# Patient Record
Sex: MALE | Race: WHITE | NOT HISPANIC OR LATINO | Employment: OTHER | URBAN - METROPOLITAN AREA
[De-identification: names, ages, dates, MRNs, and addresses within clinical notes are randomized per-mention and may not be internally consistent; named-entity substitution may affect disease eponyms.]

---

## 2017-12-06 ENCOUNTER — OFFICE VISIT (OUTPATIENT)
Dept: OTOLARYNGOLOGY | Facility: CLINIC | Age: 57
End: 2017-12-06
Payer: COMMERCIAL

## 2017-12-06 VITALS
BODY MASS INDEX: 29.95 KG/M2 | DIASTOLIC BLOOD PRESSURE: 84 MMHG | WEIGHT: 227 LBS | TEMPERATURE: 98 F | HEART RATE: 84 BPM | SYSTOLIC BLOOD PRESSURE: 143 MMHG

## 2017-12-06 DIAGNOSIS — J32.0 CHRONIC MAXILLARY SINUSITIS: Primary | ICD-10-CM

## 2017-12-06 DIAGNOSIS — R09.81 NASAL CONGESTION: ICD-10-CM

## 2017-12-06 PROCEDURE — 99203 OFFICE O/P NEW LOW 30 MIN: CPT | Performed by: OTOLARYNGOLOGY

## 2017-12-06 NOTE — PROGRESS NOTES
History of Present Illness - Akira Fair is a 56 year old male with complaints of sinus pressure and pain. He states that he has had progressively worsening pressure, swelling, and bleeding. Others are able to see the swelling in his face. He doesn't sleep well either.  He has a history of polyps that were removed in  2009 at [facility]. In 2011, he started having sinus pressure and pain again.   He uses Afrin when his symptoms are worse. He will use it up to 4 days in a row.     Past Medical History -   Patient Active Problem List   Diagnosis     Asthma     Personal history of arthritis     Vitamin D deficiency     Impotence of organic origin     Allergic rhinitis     DIANA (obstructive sleep apnea)       Current Medications -   Current Outpatient Prescriptions:      ASPIRIN PO, Take 81 mg by mouth daily, Disp: , Rfl:      Losartan Potassium (COZAAR PO), Take 50 mg by mouth, Disp: , Rfl:      fluticasone (FLONASE) 50 MCG/ACT nasal spray, Spray 1-2 sprays into both nostrils daily, Disp: 1 Package, Rfl: 11     loratadine (CLARITIN) 10 MG tablet, Take 1 tablet (10 mg) by mouth daily, Disp: 30 tablet, Rfl: 1     Tadalafil (CIALIS PO), , Disp: , Rfl:      magnesium oxide 400 MG CAPS, , Disp: , Rfl:      acetaminophen (TYLENOL) 500 MG tablet, Take 2 tablets by mouth every 6 hours as needed., Disp: , Rfl:      ALBUTEROL SULFATE  (90 BASE) MCG/ACT IN AERS, 2 puffs AS DIRECTED, Disp: , Rfl:      EPI E-Z PEN ЕКАТЕРИНА 1:1000  IJ, 1 TIME ONLY, Disp: , Rfl:      VIAGRA 100 MG OR TABS, 1 TABLET DAILY AS NEEDED, Disp: , Rfl:     Allergies -   Allergies   Allergen Reactions     Soma [Carisoprodol] Anaphylaxis     Amoxicillin Rash       Social History -   Social History     Social History     Marital status:      Spouse name: N/A     Number of children: N/A     Years of education: N/A     Social History Main Topics     Smoking status: Former Smoker     Packs/day: 1.00     Years: 30.00     Quit date: 4/12/2004      Smokeless tobacco: Never Used     Alcohol use Not on file     Drug use: Not on file     Sexual activity: Not on file     Other Topics Concern     Not on file     Social History Narrative       Family History -   Family History   Problem Relation Age of Onset     CANCER Mother      Coronary Artery Disease Father      MI       Review of Systems - As per HPI and PMHx, otherwise 7 system review of the head and neck negative. 10+ system review negative.    Physical Exam  /84 (BP Location: Right arm, Patient Position: Chair, Cuff Size: Adult Large)  Pulse 84  Temp 98  F (36.7  C) (Oral)  Wt 103 kg (227 lb)  BMI 29.95 kg/m2  General - The patient is well nourished and well developed, and appears to have good nutritional status.  Alert and oriented to person and place, answers questions and cooperates with examination appropriately.   Head and Face - Normocephalic and atraumatic, with no gross asymmetry noted of the contour of the facial features.  The facial nerve is intact, with strong symmetric movements.  Voice and Breathing - The patient was breathing comfortably without the use of accessory muscles. There was no wheezing, stridor, or stertor.  The patients voice was clear and strong, and had appropriate pitch and quality.  Ears - Bilateral pinna and EACs with normal appearing overlying skin. Tympanic membrane intact with good mobility on pneumatic otoscopy bilaterally. Bony landmarks of the ossicular chain are normal. The tympanic membranes are normal in appearance. No retraction, perforation, or masses.  No fluid or purulence was seen in the external canal or the middle ear.   Eyes - Extraocular movements intact.  Sclera were not icteric or injected, conjunctiva were pink and moist.  Mouth - Examination of the oral cavity showed pink, healthy oral mucosa. No lesions or ulcerations noted.  The tongue was mobile and midline, and the dentition were in good condition.    Throat - The walls of the oropharynx  were smooth, pink, moist, symmetric, and had no lesions or ulcerations.  The tonsillar pillars and soft palate were symmetric.  The uvula was midline on elevation.  Neck - Normal midline excursion of the laryngotracheal complex during swallowing.  Full range of motion on passive movement.  Palpation of the occipital, submental, submandibular, internal jugular chain, and supraclavicular nodes did not demonstrate any abnormal lymph nodes or masses.  The carotid pulse was palpable bilaterally.  Palpation of the thyroid was soft and smooth, with no nodules or goiter appreciated.  The trachea was mobile and midline.  Nose - External contour is symmetric, no gross deflection or scars.  Nasal mucosa is pink and moist with no abnormal mucus.  Inferior left septal deviation, turbinates of normal size and position.  No polyps, masses, or purulence noted on examination.          Assessment - Akira Fair is a 56 year old male with complaints of sinus pressure, swelling, and pain. This has been progressively worsening over the past several months. He is concerned that his polyps are returning in his sinuses. I reassured him that examination did not reveal any polyps, but further imaging would be needed to confirm any residual chronic sinusitis. I advised him that his nasal breathing seems to be fairly adequate and I would not strongly suspect that he would benefit from nasal surgery at this point, but a CT would be needed to fully evaluate. He will consult with this wife about proceeding with CT and will call to request order should he decide to schedule. Otherwise, he plans to simply continue as he is currently.     This document serves as a record of the services and decisions personally performed and made by Dr. Nate Keith MD. It was created on his behalf by Julia Vargas, a trained medical scribe. The creation of this document is based the provider's statements to the medical scribe.  Julia Vargas 2:09 PM  12/6/2017    Provider:   The information in this document, created by the medical scribe for me, accurately reflects the services I personally performed and the decisions made by me. I have reviewed and approved this document for accuracy prior to leaving the patient care area.  Dr. Nate Keith MD 2:09 PM 12/6/2017    Dr. Nate Keith MD  [facility]tolaryngology  [facility]

## 2017-12-06 NOTE — NURSING NOTE
"Initial /84 (BP Location: Right arm, Patient Position: Chair, Cuff Size: Adult Large)  Pulse 84  Temp 98  F (36.7  C) (Oral)  Wt 103 kg (227 lb)  BMI 29.95 kg/m2 Estimated body mass index is 29.95 kg/(m^2) as calculated from the following:    Height as of 12/29/15: 1.854 m (6' 1\").    Weight as of this encounter: 103 kg (227 lb). .    Maria Gilbert LPN    "

## 2017-12-06 NOTE — MR AVS SNAPSHOT
"              After Visit Summary   2017    Akira Fair    MRN: [id number]           Patient Information     Date Of Birth          1960        Visit Information        Provider Department      2017 1:45 PM Nate Keith MD [facility]        Today's Diagnoses     Chronic maxillary sinusitis    -  1      Care Instructions    Per physician's instructions    You can call and schedule a CT scan if you would like to confirm if there are any obstructions in your sinuses.           Follow-ups after your visit        Who to contact     If you have questions or need follow up information about today's clinic visit or your schedule please contact [facility] directly at [phone].  Normal or non-critical lab and imaging results will be communicated to you by MyChart, letter or phone within 4 business days after the clinic has received the results. If you do not hear from us within 7 days, please contact the clinic through MyChart or phone. If you have a critical or abnormal lab result, we will notify you by phone as soon as possible.  Submit refill requests through [employer] or call your pharmacy and they will forward the refill request to us. Please allow 3 business days for your refill to be completed.          Additional Information About Your Visit        MyChart Information     [employer] lets you send messages to your doctor, view your test results, renew your prescriptions, schedule appointments and more. To sign up, go to [email or web address] . Click on \"Log in\" on the left side of the screen, which will take you to the Welcome page. Then click on \"Sign up Now\" on the right side of the page.     You will be asked to enter the access code listed below, as well as some personal information. Please follow the directions to create your username and password.     Your access code is: [email or web address]  Expires: 3/6/2018  2:10 PM     Your access code will  in 90 days. If you " need help or a new code, please call your [place] clinic or [phone].        Care EveryWhere ID     This is your Care EveryWhere ID. This could be used by other organizations to access your [place] medical records  [id number]        Your Vitals Were     Pulse Temperature BMI (Body Mass Index)             84 98  F (36.7  C) (Oral) 29.95 kg/m2          Blood Pressure from Last 3 Encounters:   12/06/17 143/84   12/29/15 147/88   10/27/15 145/85    Weight from Last 3 Encounters:   12/06/17 103 kg (227 lb)   12/29/15 95.3 kg (210 lb)   10/27/15 101.2 kg (223 lb)              Today, you had the following     No orders found for display       Primary Care Provider Office Phone # Fax #    Steven Duane Semmler, [phone]       [facility] [address] [facility] [address]        Equal Access to Services     LEN[facility] Center[facility] : Hadii esther ku hadasho Soomaali, waaxda luqadaha, qaybta kaalmada adeegyada, miracle rodriguez . So [facility] [phone].    ATENCIÓN: Si habla español, tiene a qureshi disposición servicios gratuitos de asistencia lingüística. Llame al [phone].    We comply with applicable federal civil rights laws and [place] laws. We do not discriminate on the basis of race, color, national origin, age, disability, sex, sexual orientation, or gender identity.            Thank you!     Thank you for choosing [facility]  for your care. Our goal is always to provide you with excellent care. Hearing back from our patients is one way we can continue to improve our services. Please take a few minutes to complete the written survey that you may receive in the mail after your visit with us. Thank you!             Your Updated Medication List - Protect others around you: Learn how to safely use, store and throw away your medicines at www.disposemymeds.org.          This list is accurate as of: 12/6/17  2:10 PM.  Always use your most recent med list.                    Brand Name Dispense Instructions for use Diagnosis    albuterol 108 (90 BASE) MCG/ACT Inhaler    PROAIR HFA/PROVENTIL HFA/VENTOLIN HFA     2 puffs AS DIRECTED        ASPIRIN PO      Take 81 mg by mouth daily        CIALIS PO           CLARITIN 10 MG tablet   Generic drug:  loratadine     30 tablet    Take 1 tablet (10 mg) by mouth daily        COZAAR PO      Take 50 mg by mouth        EPI E-Z PEN ЕКАТЕРИНА 1:1000  IJ      1 TIME ONLY        FLONASE 50 MCG/ACT spray   Generic drug:  fluticasone     1 Package    Spray 1-2 sprays into both nostrils daily        magnesium oxide 400 MG Caps           TYLENOL 500 MG tablet   Generic drug:  acetaminophen      Take 2 tablets by mouth every 6 hours as needed.        VIAGRA 100 MG tablet   Generic drug:  sildenafil      1 TABLET DAILY AS NEEDED

## 2017-12-06 NOTE — PATIENT INSTRUCTIONS
Per physician's instructions    You can call and schedule a CT scan if you would like to confirm if there are any obstructions in your sinuses.

## 2017-12-06 NOTE — LETTER
12/6/2017         RE: Akira Fair  [address] [facility] [address]        Dear Colleague,    Thank you for referring your patient, Akira Fair, to [facility]. Please see a copy of my visit note below.        History of Present Illness - Akira Fair is a 56 year old male with complaints of sinus pressure and pain. He states that he has had progressively worsening pressure, swelling, and bleeding. Others are able to see the swelling in his face. He doesn't sleep well either.  He has a history of polyps that were removed in  2009 at [facility]. In 2011, he started having sinus pressure and pain again.   He uses Afrin when his symptoms are worse. He will use it up to 4 days in a row.     Past Medical History -   Patient Active Problem List   Diagnosis     Asthma     Personal history of arthritis     Vitamin D deficiency     Impotence of organic origin     Allergic rhinitis     DIANA (obstructive sleep apnea)       Current Medications -   Current Outpatient Prescriptions:      ASPIRIN PO, Take 81 mg by mouth daily, Disp: , Rfl:      Losartan Potassium (COZAAR PO), Take 50 mg by mouth, Disp: , Rfl:      fluticasone (FLONASE) 50 MCG/ACT nasal spray, Spray 1-2 sprays into both nostrils daily, Disp: 1 Package, Rfl: 11     loratadine (CLARITIN) 10 MG tablet, Take 1 tablet (10 mg) by mouth daily, Disp: 30 tablet, Rfl: 1     Tadalafil (CIALIS PO), , Disp: , Rfl:      magnesium oxide 400 MG CAPS, , Disp: , Rfl:      acetaminophen (TYLENOL) 500 MG tablet, Take 2 tablets by mouth every 6 hours as needed., Disp: , Rfl:      ALBUTEROL SULFATE  (90 BASE) MCG/ACT IN AERS, 2 puffs AS DIRECTED, Disp: , Rfl:      EPI E-Z PEN ЕКАТЕРИНА 1:1000  IJ, 1 TIME ONLY, Disp: , Rfl:      VIAGRA 100 MG OR TABS, 1 TABLET DAILY AS NEEDED, Disp: , Rfl:     Allergies -   Allergies   Allergen Reactions     Soma [Carisoprodol] Anaphylaxis     Amoxicillin Rash       Social History -   Social History     Social History      Marital status:      Spouse name: N/A     Number of children: N/A     Years of education: N/A     Social History Main Topics     Smoking status: Former Smoker     Packs/day: 1.00     Years: 30.00     Quit date: 4/12/2004     Smokeless tobacco: Never Used     Alcohol use Not on file     Drug use: Not on file     Sexual activity: Not on file     Other Topics Concern     Not on file     Social History Narrative       Family History -   Family History   Problem Relation Age of Onset     CANCER Mother      Coronary Artery Disease Father      MI       Review of Systems - As per HPI and PMHx, otherwise 7 system review of the head and neck negative. 10+ system review negative.    Physical Exam  /84 (BP Location: Right arm, Patient Position: Chair, Cuff Size: Adult Large)  Pulse 84  Temp 98  F (36.7  C) (Oral)  Wt 103 kg (227 lb)  BMI 29.95 kg/m2  General - The patient is well nourished and well developed, and appears to have good nutritional status.  Alert and oriented to person and place, answers questions and cooperates with examination appropriately.   Head and Face - Normocephalic and atraumatic, with no gross asymmetry noted of the contour of the facial features.  The facial nerve is intact, with strong symmetric movements.  Voice and Breathing - The patient was breathing comfortably without the use of accessory muscles. There was no wheezing, stridor, or stertor.  The patients voice was clear and strong, and had appropriate pitch and quality.  Ears - Bilateral pinna and EACs with normal appearing overlying skin. Tympanic membrane intact with good mobility on pneumatic otoscopy bilaterally. Bony landmarks of the ossicular chain are normal. The tympanic membranes are normal in appearance. No retraction, perforation, or masses.  No fluid or purulence was seen in the external canal or the middle ear.   Eyes - Extraocular movements intact.  Sclera were not icteric or injected, conjunctiva were pink and  moist.  Mouth - Examination of the oral cavity showed pink, healthy oral mucosa. No lesions or ulcerations noted.  The tongue was mobile and midline, and the dentition were in good condition.    Throat - The walls of the oropharynx were smooth, pink, moist, symmetric, and had no lesions or ulcerations.  The tonsillar pillars and soft palate were symmetric.  The uvula was midline on elevation.  Neck - Normal midline excursion of the laryngotracheal complex during swallowing.  Full range of motion on passive movement.  Palpation of the occipital, submental, submandibular, internal jugular chain, and supraclavicular nodes did not demonstrate any abnormal lymph nodes or masses.  The carotid pulse was palpable bilaterally.  Palpation of the thyroid was soft and smooth, with no nodules or goiter appreciated.  The trachea was mobile and midline.  Nose - External contour is symmetric, no gross deflection or scars.  Nasal mucosa is pink and moist with no abnormal mucus.  Inferior left septal deviation, turbinates of normal size and position.  No polyps, masses, or purulence noted on examination.          Assessment - Akira Fair is a 56 year old male with complaints of sinus pressure, swelling, and pain. This has been progressively worsening over the past several months. He is concerned that his polyps are returning in his sinuses. I reassured him that examination did not reveal any polyps, but further imaging would be needed to confirm any residual chronic sinusitis. I advised him that his nasal breathing seems to be fairly adequate and I would not strongly suspect that he would benefit from nasal surgery at this point, but a CT would be needed to fully evaluate. He will consult with this wife about proceeding with CT and will call to request order should he decide to schedule. Otherwise, he plans to simply continue as he is currently.     This document serves as a record of the services and decisions personally performed  and made by Dr. Nate Keith MD. It was created on his behalf by Julia Vargsa, a trained medical scribe. The creation of this document is based the provider's statements to the medical scribe.  Julia Vargas 2:09 PM 12/6/2017    Provider:   The information in this document, created by the medical scribe for me, accurately reflects the services I personally performed and the decisions made by me. I have reviewed and approved this document for accuracy prior to leaving the patient care area.  Dr. Nate Keith MD 2:09 PM 12/6/2017    Dr. Nate Keith MD  Otolaryngology  [facility]        Again, thank you for allowing me to participate in the care of your patient.        Sincerely,        Nate Keith MD

## 2018-10-15 ENCOUNTER — OFFICE VISIT (OUTPATIENT)
Dept: UROLOGY | Facility: CLINIC | Age: 58
End: 2018-10-15
Payer: COMMERCIAL

## 2018-10-15 VITALS
RESPIRATION RATE: 16 BRPM | BODY MASS INDEX: 31.81 KG/M2 | HEART RATE: 70 BPM | TEMPERATURE: 98.8 F | HEIGHT: 73 IN | DIASTOLIC BLOOD PRESSURE: 73 MMHG | WEIGHT: 240 LBS | SYSTOLIC BLOOD PRESSURE: 126 MMHG

## 2018-10-15 DIAGNOSIS — N52.02 CORPORO-VENOUS OCCLUSIVE ERECTILE DYSFUNCTION: Primary | ICD-10-CM

## 2018-10-15 PROCEDURE — 99202 OFFICE O/P NEW SF 15 MIN: CPT | Performed by: UROLOGY

## 2018-10-15 RX ORDER — SILDENAFIL CITRATE 20 MG/1
TABLET ORAL
COMMUNITY
Start: 2018-09-06

## 2018-10-15 RX ORDER — ATORVASTATIN CALCIUM 10 MG/1
5 TABLET, FILM COATED ORAL EVERY MORNING
COMMUNITY
Start: 2018-09-06 | End: 2024-03-26

## 2018-10-15 RX ORDER — EPINEPHRINE 0.3 MG/.3ML
0.3 INJECTION SUBCUTANEOUS
COMMUNITY
Start: 2018-09-06

## 2018-10-15 NOTE — NURSING NOTE
"Chief Complaint   Patient presents with     Erectile Dysfunction     consult       Initial /73 (BP Location: Right arm, Patient Position: Chair, Cuff Size: Adult Regular)  Pulse 70  Temp 98.8  F (37.1  C) (Oral)  Resp 16  Ht 1.854 m (6' 1\")  Wt 108.9 kg (240 lb)  BMI 31.66 kg/m2 Estimated body mass index is 31.66 kg/(m^2) as calculated from the following:    Height as of this encounter: 1.854 m (6' 1\").    Weight as of this encounter: 108.9 kg (240 lb).  Medications and allergies reviewed.    Margarita CAMPBELL, ENZO    "

## 2018-10-15 NOTE — MR AVS SNAPSHOT
"              After Visit Summary   10/15/2018    Akira Fair    MRN: [id number]           Patient Information     Date Of Birth          1960        Visit Information        Provider Department      10/15/2018 8:30 AM NORMAN Kauffman MD [facility]        Today's Diagnoses     Corporo-venous occlusive erectile dysfunction    -  1       Follow-ups after your visit        Who to contact     If you have questions or need follow up information about today's clinic visit or your schedule please contact [facility] directly at [phone].  Normal or non-critical lab and imaging results will be communicated to you by MyChart, letter or phone within 4 business days after the clinic has received the results. If you do not hear from us within 7 days, please contact the clinic through MyChart or phone. If you have a critical or abnormal lab result, we will notify you by phone as soon as possible.  Submit refill requests through [employer] or call your pharmacy and they will forward the refill request to us. Please allow 3 business days for your refill to be completed.          Additional Information About Your Visit        Care EveryWhere ID     This is your Care EveryWhere ID. This could be used by other organizations to access your [place] medical records  [id number]        Your Vitals Were     Pulse Temperature Respirations Height BMI (Body Mass Index)       70 98.8  F (37.1  C) (Oral) 16 1.854 m (6' 1\") 31.66 kg/m2        Blood Pressure from Last 3 Encounters:   10/15/18 126/73   12/06/17 143/84   12/29/15 147/88    Weight from Last 3 Encounters:   10/15/18 108.9 kg (240 lb)   12/06/17 103 kg (227 lb)   12/29/15 95.3 kg (210 lb)              Today, you had the following     No orders found for display         Today's Medication Changes          These changes are accurate as of 10/15/18 11:59 PM.  If you have any questions, ask your nurse or doctor.               Start taking these " medicines.        Dose/Directions    alprostadil 20 MCG kit   Commonly known as:  EDEX   Used for:  Corporo-venous occlusive erectile dysfunction   Started by:  NORMAN Kauffman MD        Dose:  20 mcg   20 mcg by Intracavitary route as needed for erectile dysfunction use no more than 3 times per week   Quantity:  1 kit   Refills:  3            Where to get your medicines      Some of these will need a paper prescription and others can be bought over the counter.  Ask your nurse if you have questions.     Bring a paper prescription for each of these medications     alprostadil 20 MCG kit                Primary Care Provider Office Phone # Fax #    Elvin Duane Semmler, [phone]       [facility] [address] [facility] [address]        Equal Access to Services     JAYESH MIMS : Hadii esther parra hadasho Soomaali, waaxda luqadaha, qaybta kaalmada adeegyada, miracle rodriguez . So [facility] [phone].    ATENCIÓN: Si habla español, tiene a qureshi disposición servicios gratuitos de asistencia lingüística. [facility] [phone].    We comply with applicable federal civil rights laws and [place] laws. We do not discriminate on the basis of race, color, national origin, age, disability, sex, sexual orientation, or gender identity.            Thank you!     Thank you for choosing [facility]  for your care. Our goal is always to provide you with excellent care. Hearing back from our patients is one way we can continue to improve our services. Please take a few minutes to complete the written survey that you may receive in the mail after your visit with us. Thank you!             Your Updated Medication List - Protect others around you: Learn how to safely use, store and throw away your medicines at www.disposemymeds.org.          This list is accurate as of 10/15/18 11:59 PM.  Always use your most recent med list.                   Brand Name Dispense  Instructions for use Diagnosis    albuterol 108 (90 Base) MCG/ACT inhaler    PROAIR HFA/PROVENTIL HFA/VENTOLIN HFA     2 puffs AS DIRECTED        alprostadil 20 MCG kit    EDEX    1 kit    20 mcg by Intracavitary route as needed for erectile dysfunction use no more than 3 times per week    Corporo-venous occlusive erectile dysfunction       ASPIRIN PO      Take 81 mg by mouth daily        atorvastatin 10 MG tablet    LIPITOR     Take 5 mg by mouth        CLARITIN 10 MG tablet   Generic drug:  loratadine     30 tablet    Take 1 tablet (10 mg) by mouth daily        COZAAR PO      Take 50 mg by mouth        EPI E-Z PEN ЕКАТЕРИНА 1:1000  IJ      1 TIME ONLY        EPINEPHrine 0.3 MG/0.3ML injection 2-pack    EPIPEN/ADRENACLICK/or ANY BX GENERIC EQUIV     Inject 0.3 mg into the muscle        FLONASE 50 MCG/ACT spray   Generic drug:  fluticasone     1 Package    Spray 1-2 sprays into both nostrils daily        magnesium oxide 400 MG Caps           sildenafil 20 MG tablet    REVATIO     Take 1-5 tablet as needed for erectile dysfunction        TYLENOL 500 MG tablet   Generic drug:  acetaminophen      Take 2 tablets by mouth every 6 hours as needed.

## 2018-10-16 NOTE — PROGRESS NOTES
Appointment source: New Patient  Patient name: Akira Fair  Urology Staff: Rich Kauffman MD    Consultation request Elvin Payton MD    Subjective: This is a 57 year old year old male complaining of erectile dysfunction.    Objective:  States that erections have not been satisfactory for intercourse for about a year.    Has had some success with phosphodiesterase inhibitors.    Family history: prostate cancer (father)    Social history: reviewed from primary care clinic visit. History of smoking and occasional alcohol.    ROS: comprehensive 10 point ROS obtained and otherwise negative other than chief complaint and problem list    PSA 1.82 ng/ml (9/6/18)    Examination:    Healthy male  HEENT: anicteric sclera, normal extraocular movements  Respiratory: normal, non labored breathing  Musculoskeletal:Normal muscular movements  Skin normal temperature, no rash  Psychiatric: appropriate affect    Abdomen benign  No evidence of inguinal hernia  No evidence of inguinal adenopathy  Phallus without lesion. No evidence of peyronie's plaque  Scrotal contents normal  Rectal examination normal  Prostate benign to palpation      Assessment:  erectile dysfunction (multi factorial)    Plan:  Will continue use of phosphodiesterase inhibitors but also prescribe intra cavernosal prostaglandin.    He will consider these interventions and get back to me.

## 2018-12-09 ENCOUNTER — APPOINTMENT (OUTPATIENT)
Dept: GENERAL RADIOLOGY | Facility: CLINIC | Age: 58
End: 2018-12-09
Attending: EMERGENCY MEDICINE
Payer: COMMERCIAL

## 2018-12-09 ENCOUNTER — APPOINTMENT (OUTPATIENT)
Dept: CT IMAGING | Facility: CLINIC | Age: 58
End: 2018-12-09
Attending: EMERGENCY MEDICINE
Payer: COMMERCIAL

## 2018-12-09 ENCOUNTER — HOSPITAL ENCOUNTER (EMERGENCY)
Facility: CLINIC | Age: 58
Discharge: HOME | End: 2018-12-09
Attending: EMERGENCY MEDICINE | Admitting: EMERGENCY MEDICINE
Payer: COMMERCIAL

## 2018-12-09 VITALS
BODY MASS INDEX: 31 KG/M2 | DIASTOLIC BLOOD PRESSURE: 79 MMHG | OXYGEN SATURATION: 95 % | SYSTOLIC BLOOD PRESSURE: 131 MMHG | WEIGHT: 235 LBS | HEART RATE: 97 BPM | RESPIRATION RATE: 27 BRPM | TEMPERATURE: 98 F

## 2018-12-09 DIAGNOSIS — K76.89 HEPATIC CYST: ICD-10-CM

## 2018-12-09 DIAGNOSIS — R07.81 PLEURITIC CHEST PAIN: ICD-10-CM

## 2018-12-09 DIAGNOSIS — R91.8 PULMONARY NODULES: ICD-10-CM

## 2018-12-09 LAB
ALBUMIN SERPL-MCNC: 3.9 G/DL (ref 3.4–5)
ALP SERPL-CCNC: 73 U/L (ref 40–150)
ALT SERPL W P-5'-P-CCNC: 46 U/L (ref 0–70)
ANION GAP SERPL CALCULATED.3IONS-SCNC: 8 MMOL/L (ref 3–14)
AST SERPL W P-5'-P-CCNC: 20 U/L (ref 0–45)
BASOPHILS # BLD AUTO: 0 10E9/L (ref 0–0.2)
BASOPHILS NFR BLD AUTO: 0.3 %
BILIRUB SERPL-MCNC: 0.7 MG/DL (ref 0.2–1.3)
BUN SERPL-MCNC: 18 MG/DL (ref 7–30)
CALCIUM SERPL-MCNC: 8.8 MG/DL (ref 8.5–10.1)
CHLORIDE SERPL-SCNC: 105 MMOL/L (ref 94–109)
CO2 SERPL-SCNC: 25 MMOL/L (ref 20–32)
CREAT SERPL-MCNC: 1.05 MG/DL (ref 0.66–1.25)
DIFFERENTIAL METHOD BLD: NORMAL
EOSINOPHIL # BLD AUTO: 0.2 10E9/L (ref 0–0.7)
EOSINOPHIL NFR BLD AUTO: 1.4 %
ERYTHROCYTE [DISTWIDTH] IN BLOOD BY AUTOMATED COUNT: 12.5 % (ref 10–15)
GFR SERPL CREATININE-BSD FRML MDRD: 72 ML/MIN/1.7M2
GLUCOSE SERPL-MCNC: 117 MG/DL (ref 70–99)
HCT VFR BLD AUTO: 47 % (ref 40–53)
HGB BLD-MCNC: 16.2 G/DL (ref 13.3–17.7)
IMM GRANULOCYTES # BLD: 0 10E9/L (ref 0–0.4)
IMM GRANULOCYTES NFR BLD: 0.2 %
LYMPHOCYTES # BLD AUTO: 1.9 10E9/L (ref 0.8–5.3)
LYMPHOCYTES NFR BLD AUTO: 17.7 %
MCH RBC QN AUTO: 31.2 PG (ref 26.5–33)
MCHC RBC AUTO-ENTMCNC: 34.5 G/DL (ref 31.5–36.5)
MCV RBC AUTO: 91 FL (ref 78–100)
MONOCYTES # BLD AUTO: 1.1 10E9/L (ref 0–1.3)
MONOCYTES NFR BLD AUTO: 10 %
NEUTROPHILS # BLD AUTO: 7.6 10E9/L (ref 1.6–8.3)
NEUTROPHILS NFR BLD AUTO: 70.4 %
NRBC # BLD AUTO: 0 10*3/UL
NRBC BLD AUTO-RTO: 0 /100
NT-PROBNP SERPL-MCNC: 38 PG/ML (ref 0–900)
PLATELET # BLD AUTO: 227 10E9/L (ref 150–450)
POTASSIUM SERPL-SCNC: 4 MMOL/L (ref 3.4–5.3)
PROT SERPL-MCNC: 7.4 G/DL (ref 6.8–8.8)
RBC # BLD AUTO: 5.19 10E12/L (ref 4.4–5.9)
SODIUM SERPL-SCNC: 138 MMOL/L (ref 133–144)
TROPONIN I SERPL-MCNC: <0.015 UG/L (ref 0–0.04)
WBC # BLD AUTO: 10.7 10E9/L (ref 4–11)

## 2018-12-09 PROCEDURE — 25000128 H RX IP 250 OP 636: Performed by: EMERGENCY MEDICINE

## 2018-12-09 PROCEDURE — 93010 ELECTROCARDIOGRAM REPORT: CPT | Mod: Z6 | Performed by: EMERGENCY MEDICINE

## 2018-12-09 PROCEDURE — 96374 THER/PROPH/DIAG INJ IV PUSH: CPT | Mod: 59 | Performed by: EMERGENCY MEDICINE

## 2018-12-09 PROCEDURE — 80053 COMPREHEN METABOLIC PANEL: CPT | Performed by: EMERGENCY MEDICINE

## 2018-12-09 PROCEDURE — 85025 COMPLETE CBC W/AUTO DIFF WBC: CPT | Performed by: EMERGENCY MEDICINE

## 2018-12-09 PROCEDURE — 99285 EMERGENCY DEPT VISIT HI MDM: CPT | Mod: 25 | Performed by: EMERGENCY MEDICINE

## 2018-12-09 PROCEDURE — 71046 X-RAY EXAM CHEST 2 VIEWS: CPT

## 2018-12-09 PROCEDURE — 71260 CT THORAX DX C+: CPT

## 2018-12-09 PROCEDURE — 93005 ELECTROCARDIOGRAM TRACING: CPT | Performed by: EMERGENCY MEDICINE

## 2018-12-09 PROCEDURE — 83880 ASSAY OF NATRIURETIC PEPTIDE: CPT | Performed by: EMERGENCY MEDICINE

## 2018-12-09 PROCEDURE — 84484 ASSAY OF TROPONIN QUANT: CPT | Performed by: EMERGENCY MEDICINE

## 2018-12-09 PROCEDURE — 25000125 ZZHC RX 250: Performed by: EMERGENCY MEDICINE

## 2018-12-09 RX ORDER — ASPIRIN 81 MG/1
81 TABLET ORAL DAILY
COMMUNITY
Start: 2015-07-15

## 2018-12-09 RX ORDER — KETOROLAC TROMETHAMINE 30 MG/ML
30 INJECTION, SOLUTION INTRAMUSCULAR; INTRAVENOUS ONCE
Status: COMPLETED | OUTPATIENT
Start: 2018-12-09 | End: 2018-12-09

## 2018-12-09 RX ORDER — IOPAMIDOL 755 MG/ML
85 INJECTION, SOLUTION INTRAVASCULAR ONCE
Status: COMPLETED | OUTPATIENT
Start: 2018-12-09 | End: 2018-12-09

## 2018-12-09 RX ORDER — ASPIRIN 325 MG/1
325 TABLET, FILM COATED ORAL ONCE
COMMUNITY
End: 2024-04-23

## 2018-12-09 RX ADMIN — IOPAMIDOL 85 ML: 755 INJECTION, SOLUTION INTRAVENOUS at 15:57

## 2018-12-09 RX ADMIN — SODIUM CHLORIDE 100 ML: 9 INJECTION, SOLUTION INTRAVENOUS at 15:58

## 2018-12-09 RX ADMIN — KETOROLAC TROMETHAMINE 30 MG: 30 INJECTION, SOLUTION INTRAMUSCULAR at 12:06

## 2018-12-09 NOTE — ED AVS SNAPSHOT
[facility] Emergency Department  [address] [facility] [address] [address]  Phone:  [phone]  Fax:  [phone]                                    Akira Fair   MRN: [id number]    Department:  [facility] Emergency Department   Date of Visit:  12/9/2018           After Visit Summary Signature Page    I have received my discharge instructions, and my questions have been answered. I have discussed any challenges I see with this plan with the nurse or doctor.    ..........................................................................................................................................  Patient/Patient Representative Signature      ..........................................................................................................................................  Patient Representative Print Name and Relationship to Patient    ..................................................               ................................................  Date                                   Time    ..........................................................................................................................................  Reviewed by Signature/Title    ...................................................              ..............................................  Date                                               Time          22EPIC Rev 08/18

## 2018-12-09 NOTE — ED PROVIDER NOTES
"  History     Chief Complaint   Patient presents with     Shortness of Breath     chest tightness     HPI  Akira Fair is a 58 year old male who presents to the ED complaining of chest pain. The patient states that at 7 pm last night his heart rate increased and he felt as if pleurisy was beginning which made it hard to breath. He also mentions that he felt like his chest was \"spasming\" and he felt very nauseas so he went to bed. The patient woke up today very sore and experienced \"spasming in and across the chest down to the xiphoid process\" so he came to the ED. He reports that right now his chest is \"stinging.\" He states it does not hurt to push on the chest, but it is painful to breath, especially when taking deep breaths. Patient says he cannot think of a cause for the pain and he has never had the \"spasming\" feeling before, though he had pleurisy 30 years ago. He states that moving backwards is more painful. His bowel movements have been normal and he denies any leg symptoms.  No history of a risk factor for DVT/PE.  Patient still has his gallbladder.      Problem List:    Patient Active Problem List    Diagnosis Date Noted     DIANA (obstructive sleep apnea) 10/22/2013     Priority: Medium     Mild DIANA (HST on 10/21/2013 with AHI 9.4, odalys desat 84%)       Asthma      Priority: Medium     Personal history of arthritis      Priority: Medium     Vitamin D deficiency      Priority: Medium     Problem list name updated by automated process. Provider to review       Impotence of organic origin      Priority: Medium     Allergic rhinitis      Priority: Medium        Past Medical History:    Past Medical History:   Diagnosis Date     Allergic rhinitis      Asthma      Impotence of organic origin      Personal history of arthritis      PONV (postoperative nausea and vomiting)      Unspecified vitamin D deficiency        Past Surgical History:    Past Surgical History:   Procedure Laterality Date     ENDOSCOPIC " RELEASE ULNAR NERVE (ELBOW) Right 10/27/2015    Procedure: ENDOSCOPIC RELEASE ULNAR NERVE (ELBOW);  Surgeon: Elvin Dey MD;  Location: [facility]     ENDOSCOPIC TRANSPOSITION ULNAR NERVE (ELBOW) Left 2015    Procedure: ENDOSCOPIC TRANSPOSITION ULNAR NERVE (ELBOW);  Surgeon: Elvin Dey MD;  Location: [facility]     NASAL/SINUS POLYPECTOMY Bilateral 2009    [facility]     ORTHOPEDIC SURGERY       RELEASE CARPAL TUNNEL  10/27/2015    Procedure: RELEASE CARPAL TUNNEL;  Surgeon: Elvin Dey MD;  Location: [facility]     RELEASE CARPAL TUNNEL Left 2015    Procedure: RELEASE CARPAL TUNNEL;  Surgeon: Elvin Dey MD;  Location: [facility]       Family History:    Family History   Problem Relation Age of Onset     Cancer Mother      Coronary Artery Disease Father         MI       Social History:  Marital Status:   [2]  Social History     Tobacco Use     Smoking status: Former Smoker     Packs/day: 1.00     Years: 30.00     Pack years: 30.00     Last attempt to quit: 2004     Years since quittin.6     Smokeless tobacco: Never Used   Substance Use Topics     Alcohol use: Yes     Comment: occas     Drug use: No        Medications:      aspirin - buffered (ASCRIPTIN) 325 MG TABS tablet   aspirin 81 MG EC tablet   atorvastatin (LIPITOR) 10 MG tablet   fluticasone (FLONASE) 50 MCG/ACT nasal spray   loratadine (CLARITIN) 10 MG tablet   Losartan Potassium (COZAAR PO)   magnesium oxide 400 MG CAPS   sildenafil (REVATIO) 20 MG tablet   ALBUTEROL SULFATE  (90 BASE) MCG/ACT IN AERS   EPINEPHrine (EPIPEN/ADRENACLICK/OR ANY BX GENERIC EQUIV) 0.3 MG/0.3ML injection 2-pack         Review of Systems   All other systems are reviewed and are negative.    Physical Exam   BP: 147/83  Pulse: 97  Heart Rate: 87  Temp: 98  F (36.7  C)  Resp: 18  Weight: 106.6 kg (235 lb)  SpO2: 98 %      Physical Exam   Nontoxic appearing no respiratory distress alert and oriented ×3  Head atraumatic  normocephalic  TMs/EACs unremarkable, conjunctiva noninjected, oropharynx moist without lesions or erythema  No cervical adenopathy   Neck supple full active painless range of motion  Lungs clear to auscultation  Heart regular no murmur  Abdomen soft nontender bowel sounds positive no masses or HSM  Strength and sensation grossly intact throughout the extremities, gait and station normal  Speech is fluent, good eye contact, thought processes are rational  Lower extremities without swelling, redness or tenderness  Pedal pulses symmetrical and strong    ED Course        Procedures  EKG normal sinus rhythm rate 87, RSR prime, no acute ST-T wave changes, no prior for comparison, read by Dr. Juan Carlos Hogan             Critical Care time:  none     Results for orders placed or performed during the hospital encounter of 12/09/18   Chest XR,  PA & LAT    Narrative    CHEST TWO VIEWS  12/9/2018 12:04 PM     HISTORY: Right-sided chest pain. Pleurisy.    COMPARISON: None.      Impression    IMPRESSION: Patchy infiltrate at the lateral left lung base could be  chronic or acute. Right lung is clear. Normal heart size and pulmonary  vascularity. No rib abnormality.    ANGELA ARIZMENDI MD   Chest CT - IV contrast only - PE protocol    Narrative    CT CHEST PULMONARY EMBOLISM WITH CONTRAST   12/9/2018 4:10 PM    HISTORY: Pleuritic right-sided chest pain. Evaluate for pulmonary  embolism.    TECHNIQUE: Pulmonary embolism protocol was performed. 85 mL Isovue 370  were injected IV. After contrast injection, volumetric helical  acquisition was performed from the thoracic inlet through the upper  abdomen. Radiation dose for this scan was reduced using automated  exposure control, adjustment of the mA and/or kV according to patient  size, or iterative reconstruction technique.    COMPARISON: None.    FINDINGS:  No evidence for pulmonary embolism. The thoracic aorta is  of normal caliber, without evidence for thoracic aortic aneurysm  or  dissection. Atherosclerotic calcification of the thoracic aorta. No  pleural or pericardial effusions. No enlarged lymph nodes are  identified in the chest. There is mild patchy atelectasis at both lung  bases. A few small indeterminate pulmonary nodules in both lungs  measure 0.4 cm or smaller. Multiple hepatic cysts are noted, with the  largest in the lateral segment of the left hepatic lobe measuring 7.9  cm. Diffuse fatty infiltration of the liver. Limited views of the  upper abdomen are otherwise unremarkable.      Impression    IMPRESSION:   1. No evidence for pulmonary embolism or thoracic aortic dissection.  2. Mild patchy atelectasis at both lung bases.  3. Few small indeterminate pulmonary nodules in both lungs measure 0.4  cm or smaller. Please refer to pulmonary nodule follow-up guidelines  below.  4. Diffuse fatty infiltration of the liver.    Recommendations for one or multiple incidental lung nodules < 6mm :    Low risk patients: No routine follow-up.    High risk patients: Optional follow-up CT at 12 months; if  unchanged, no further follow-up.    *Low Risk: Minimal or absent history of smoking or other known risk  factors.  *Nonsolid (ground glass) or partly solid nodules may require longer  follow-up to exclude indolent adenocarcinoma.  *Recommendations based on Guidelines for the Management of Incidental  Pulmonary Nodules Detected at CT: From the Fleischner Society 2017,  Radiology 2017.       CLOTILDE DUKES MD   CBC with platelets differential   Result Value Ref Range    WBC 10.7 4.0 - 11.0 10e9/L    RBC Count 5.19 4.4 - 5.9 10e12/L    Hemoglobin 16.2 13.3 - 17.7 g/dL    Hematocrit 47.0 40.0 - 53.0 %    MCV 91 78 - 100 fl    MCH 31.2 26.5 - 33.0 pg    MCHC 34.5 31.5 - 36.5 g/dL    RDW 12.5 10.0 - 15.0 %    Platelet Count 227 150 - 450 10e9/L    Diff Method Automated Method     % Neutrophils 70.4 %    % Lymphocytes 17.7 %    % Monocytes 10.0 %    % Eosinophils 1.4 %    % Basophils 0.3 %     % Immature Granulocytes 0.2 %    Nucleated RBCs 0 0 /100    Absolute Neutrophil 7.6 1.6 - 8.3 10e9/L    Absolute Lymphocytes 1.9 0.8 - 5.3 10e9/L    Absolute Monocytes 1.1 0.0 - 1.3 10e9/L    Absolute Eosinophils 0.2 0.0 - 0.7 10e9/L    Absolute Basophils 0.0 0.0 - 0.2 10e9/L    Abs Immature Granulocytes 0.0 0 - 0.4 10e9/L    Absolute Nucleated RBC 0.0    Comprehensive metabolic panel   Result Value Ref Range    Sodium 138 133 - 144 mmol/L    Potassium 4.0 3.4 - 5.3 mmol/L    Chloride 105 94 - 109 mmol/L    Carbon Dioxide 25 20 - 32 mmol/L    Anion Gap 8 3 - 14 mmol/L    Glucose 117 (H) 70 - 99 mg/dL    Urea Nitrogen 18 7 - 30 mg/dL    Creatinine 1.05 0.66 - 1.25 mg/dL    GFR Estimate 72 >60 mL/min/1.7m2    GFR Estimate If Black 87 >60 mL/min/1.7m2    Calcium 8.8 8.5 - 10.1 mg/dL    Bilirubin Total 0.7 0.2 - 1.3 mg/dL    Albumin 3.9 3.4 - 5.0 g/dL    Protein Total 7.4 6.8 - 8.8 g/dL    Alkaline Phosphatase 73 40 - 150 U/L    ALT 46 0 - 70 U/L    AST 20 0 - 45 U/L   Troponin I   Result Value Ref Range    Troponin I ES <0.015 0.000 - 0.045 ug/L   Nt probnp inpatient   Result Value Ref Range    N-Terminal Pro BNP Inpatient 38 0 - 900 pg/mL                 No results found for this or any previous visit (from the past 24 hour(s)).    Medications   ketorolac (TORADOL) injection 30 mg (30 mg Intravenous Given 12/9/18 1537)   iopamidol (ISOVUE-370) solution 85 mL (85 mLs Intravenous Given 12/9/18 8993)   sodium chloride 0.9 % bag 500mL for CT scan flush use (100 mLs As instructed Given 12/9/18 8096)     3:27 PM Patient assessed.     Assessments & Plan (with Medical Decision Making)  58-year-old male presents with chest pain as outlined per HPI.  Usual differential considered, ECG without acute ischemic change, troponin normal.  Chest x-ray was obtained and reviewed independently as well as radiology read, shows atelectasis at the left base.  Given pleuritic component to discomfort and changes on chest x-ray CT scan  undertaken, no evidence for pulmonary embolism, scant bibasilar atelectasis.  CBC, CMP and BNP normal.  Symptoms may represent thoracic radiculopathy, chest wall pain, peripheral neuropathy versus other.  No indication for further evaluation at this time.  Recommend follow-up primary care.  Return criteria reviewed.     I have reviewed the nursing notes.    I have reviewed the findings, diagnosis, plan and need for follow up with the patient.          Review of your medicines      UNREVIEWED medicines. Ask your doctor about these medicines      Dose / Directions   albuterol 108 (90 Base) MCG/ACT inhaler  Commonly known as:  PROAIR HFA/PROVENTIL HFA/VENTOLIN HFA      2 puffs AS DIRECTED  Refills:  0     aspirin - buffered 325 MG Tabs tablet  Commonly known as:  ASCRIPTIN      Dose:  325 mg  Take 325 mg by mouth once  Refills:  0     aspirin 81 MG EC tablet      Dose:  81 mg  Take 81 mg by mouth daily  Refills:  0     atorvastatin 10 MG tablet  Commonly known as:  LIPITOR      Dose:  5 mg  Take 5 mg by mouth every morning  Refills:  0     CLARITIN 10 MG tablet  Generic drug:  loratadine      Dose:  10 mg  Take 1 tablet (10 mg) by mouth daily  Quantity:  30 tablet  Refills:  1     COZAAR PO  Indication:  High Blood Pressure Disorder      Dose:  50 mg  Take 50 mg by mouth  Refills:  0     EPINEPHrine 0.3 MG/0.3ML injection 2-pack  Commonly known as:  EPIPEN/ADRENACLICK/or ANY BX GENERIC EQUIV      Dose:  0.3 mg  Inject 0.3 mg into the muscle  Refills:  0     FLONASE 50 MCG/ACT nasal spray  Generic drug:  fluticasone      Dose:  1-2 spray  Spray 1-2 sprays into both nostrils daily  Quantity:  1 Package  Refills:  11     magnesium oxide 400 MG Caps      Dose:  250 mg  Take 250 mg by mouth every morning  Refills:  0     sildenafil 20 MG tablet  Commonly known as:  REVATIO      Take 1-5 tablet as needed for erectile dysfunction  Refills:  0            Final diagnoses:   Pleuritic chest pain   Pulmonary nodules   Hepatic  cyst     This document serves as a record of the services and decisions personally performed and made by Juan Carlos Hogan MD. It was created on HIS/HER behalf by   Marty Keys, a trained medical scribe. The creation of this document is based the provider's statements to the medical scribe.  Marty Keys 3:27 PM 12/9/2018    Provider:   The information in this document, created by the medical scribe for me, accurately reflects the services I personally performed and the decisions made by me. I have reviewed and approved this document for accuracy prior to leaving the patient care area.  Juan Carlos Hogan MD 3:27 PM 12/9/2018 12/9/2018   [facility] EMERGENCY DEPARTMENT     Juan Carlos Hogan MD  12/12/18 1928

## 2018-12-09 NOTE — DISCHARGE INSTRUCTIONS
Tylenol/ibuprofen for discomfort, follow-up primary care for further evaluation, return here for progressive pain, shortness of air, passing out or any other concern.    Happy birthday

## 2019-12-17 ENCOUNTER — ANESTHESIA EVENT (OUTPATIENT)
Dept: GASTROENTEROLOGY | Facility: CLINIC | Age: 59
End: 2019-12-17
Payer: COMMERCIAL

## 2019-12-17 ASSESSMENT — LIFESTYLE VARIABLES: TOBACCO_USE: 1

## 2019-12-17 NOTE — ANESTHESIA PREPROCEDURE EVALUATION
Anesthesia Pre-Procedure Evaluation    Patient: Akira Fair   MRN: [id number] : 1960          Preoperative Diagnosis: Special screening for malignant neoplasms, colon [Z12.11]    Procedure(s):  COLONOSCOPY    Past Medical History:   Diagnosis Date     Allergic rhinitis      Asthma      Impotence of organic origin      Personal history of arthritis      PONV (postoperative nausea and vomiting)      Unspecified vitamin D deficiency      Past Surgical History:   Procedure Laterality Date     ENDOSCOPIC RELEASE ULNAR NERVE (ELBOW) Right 10/27/2015    Procedure: ENDOSCOPIC RELEASE ULNAR NERVE (ELBOW);  Surgeon: Elvin Dey MD;  Location: [facility]     ENDOSCOPIC TRANSPOSITION ULNAR NERVE (ELBOW) Left 2015    Procedure: ENDOSCOPIC TRANSPOSITION ULNAR NERVE (ELBOW);  Surgeon: Elvin Dey MD;  Location: [facility]     NASAL/SINUS POLYPECTOMY Bilateral     [facility]     ORTHOPEDIC SURGERY       RELEASE CARPAL TUNNEL  10/27/2015    Procedure: RELEASE CARPAL TUNNEL;  Surgeon: Elvin Dey MD;  Location: [facility]     RELEASE CARPAL TUNNEL Left 2015    Procedure: RELEASE CARPAL TUNNEL;  Surgeon: Elvin Dey MD;  Location: [facility]       Anesthesia Evaluation     . Pt has had prior anesthetic.     History of anesthetic complications   - PONV        ROS/MED HX    ENT/Pulmonary:     (+)sleep apnea, allergic rhinitis, tobacco use, Past use asthma , . .    Neurologic:       Cardiovascular:     (+) ----. : . . . :. . Previous cardiac testing date:results:date: results:ECG reviewed date:18 results:Sinus Rhythm   -RSR(V1) -nondiagnostic.    - Nonspecific T-abnormality.     ABNORMAL    date: results:          METS/Exercise Tolerance:     Hematologic:         Musculoskeletal:   (+) arthritis,  -       GI/Hepatic:         Renal/Genitourinary: Comment: impotence        Endo:         Psychiatric:         Infectious Disease:         Malignancy:         Other: Comment: Vit. D deficiency     "                     Physical Exam  Normal systems: cardiovascular, pulmonary and dental    Airway   Mallampati: II  TM distance: >3 FB  Neck ROM: full    Dental     Cardiovascular       Pulmonary             Lab Results   Component Value Date    WBC 10.7 12/09/2018    HGB 16.2 12/09/2018    HCT 47.0 12/09/2018     12/09/2018     12/09/2018    POTASSIUM 4.0 12/09/2018    CHLORIDE 105 12/09/2018    CO2 25 12/09/2018    BUN 18 12/09/2018    CR 1.05 12/09/2018     (H) 12/09/2018    LINDA 8.8 12/09/2018    ALBUMIN 3.9 12/09/2018    PROTTOTAL 7.4 12/09/2018    ALT 46 12/09/2018    AST 20 12/09/2018    ALKPHOS 73 12/09/2018    BILITOTAL 0.7 12/09/2018       Preop Vitals  BP Readings from Last 3 Encounters:   12/09/18 131/79   10/15/18 126/73   12/06/17 143/84    Pulse Readings from Last 3 Encounters:   12/09/18 97   10/15/18 70   12/06/17 84      Resp Readings from Last 3 Encounters:   12/09/18 27   10/15/18 16   12/29/15 16    SpO2 Readings from Last 3 Encounters:   12/09/18 95%   12/29/15 98%   10/27/15 98%      Temp Readings from Last 1 Encounters:   12/09/18 36.7  C (98  F) (Temporal)    Ht Readings from Last 1 Encounters:   10/15/18 1.854 m (6' 1\")      Wt Readings from Last 1 Encounters:   12/09/18 106.6 kg (235 lb)    Estimated body mass index is 31 kg/m  as calculated from the following:    Height as of 10/15/18: 1.854 m (6' 1\").    Weight as of 12/9/18: 106.6 kg (235 lb).       Anesthesia Plan      History & Physical Review  History and physical reviewed and following examination; no interval change.    ASA Status:  2 .    NPO Status:  > 6 hours    Plan for General Maintenance will be TIVA.           Postoperative Care      Consents  Anesthetic plan, risks, benefits and alternatives discussed with:  Patient..                 INES Linder CRNA  "

## 2019-12-19 ENCOUNTER — ANESTHESIA (OUTPATIENT)
Dept: GASTROENTEROLOGY | Facility: CLINIC | Age: 59
End: 2019-12-19
Payer: COMMERCIAL

## 2019-12-19 ENCOUNTER — HOSPITAL ENCOUNTER (OUTPATIENT)
Facility: CLINIC | Age: 59
Discharge: HOME | End: 2019-12-19
Attending: SURGERY | Admitting: SURGERY
Payer: COMMERCIAL

## 2019-12-19 VITALS
BODY MASS INDEX: 28.6 KG/M2 | HEIGHT: 75 IN | DIASTOLIC BLOOD PRESSURE: 82 MMHG | HEART RATE: 59 BPM | OXYGEN SATURATION: 97 % | SYSTOLIC BLOOD PRESSURE: 122 MMHG | WEIGHT: 230 LBS | RESPIRATION RATE: 16 BRPM | TEMPERATURE: 97.7 F

## 2019-12-19 LAB — COLONOSCOPY: NORMAL

## 2019-12-19 PROCEDURE — 25800030 ZZH RX IP 258 OP 636: Performed by: SURGERY

## 2019-12-19 PROCEDURE — 88305 TISSUE EXAM BY PATHOLOGIST: CPT | Performed by: SURGERY

## 2019-12-19 PROCEDURE — 45385 COLONOSCOPY W/LESION REMOVAL: CPT | Mod: PT | Performed by: SURGERY

## 2019-12-19 PROCEDURE — 37000008 ZZH ANESTHESIA TECHNICAL FEE, 1ST 30 MIN: Performed by: SURGERY

## 2019-12-19 PROCEDURE — 45380 COLONOSCOPY AND BIOPSY: CPT | Performed by: SURGERY

## 2019-12-19 PROCEDURE — 88305 TISSUE EXAM BY PATHOLOGIST: CPT | Mod: 26,76 | Performed by: SURGERY

## 2019-12-19 PROCEDURE — 25000125 ZZHC RX 250: Performed by: NURSE ANESTHETIST, CERTIFIED REGISTERED

## 2019-12-19 PROCEDURE — 25000125 ZZHC RX 250: Performed by: SURGERY

## 2019-12-19 PROCEDURE — 25000128 H RX IP 250 OP 636: Performed by: NURSE ANESTHETIST, CERTIFIED REGISTERED

## 2019-12-19 RX ORDER — PROPOFOL 10 MG/ML
INJECTION, EMULSION INTRAVENOUS CONTINUOUS PRN
Status: DISCONTINUED | OUTPATIENT
Start: 2019-12-19 | End: 2019-12-19

## 2019-12-19 RX ORDER — ONDANSETRON 2 MG/ML
4 INJECTION INTRAMUSCULAR; INTRAVENOUS
Status: DISCONTINUED | OUTPATIENT
Start: 2019-12-19 | End: 2019-12-19 | Stop reason: HOSPADM

## 2019-12-19 RX ORDER — PROPOFOL 10 MG/ML
INJECTION, EMULSION INTRAVENOUS PRN
Status: DISCONTINUED | OUTPATIENT
Start: 2019-12-19 | End: 2019-12-19

## 2019-12-19 RX ORDER — LIDOCAINE 40 MG/G
CREAM TOPICAL
Status: DISCONTINUED | OUTPATIENT
Start: 2019-12-19 | End: 2019-12-19 | Stop reason: HOSPADM

## 2019-12-19 RX ORDER — SODIUM CHLORIDE, SODIUM LACTATE, POTASSIUM CHLORIDE, CALCIUM CHLORIDE 600; 310; 30; 20 MG/100ML; MG/100ML; MG/100ML; MG/100ML
INJECTION, SOLUTION INTRAVENOUS CONTINUOUS
Status: DISCONTINUED | OUTPATIENT
Start: 2019-12-19 | End: 2019-12-19 | Stop reason: HOSPADM

## 2019-12-19 RX ADMIN — SODIUM CHLORIDE, POTASSIUM CHLORIDE, SODIUM LACTATE AND CALCIUM CHLORIDE: 600; 310; 30; 20 INJECTION, SOLUTION INTRAVENOUS at 06:56

## 2019-12-19 RX ADMIN — LIDOCAINE HYDROCHLORIDE 1 ML: 10 INJECTION, SOLUTION EPIDURAL; INFILTRATION; INTRACAUDAL; PERINEURAL at 06:56

## 2019-12-19 RX ADMIN — PROPOFOL 100 MG: 10 INJECTION, EMULSION INTRAVENOUS at 07:38

## 2019-12-19 RX ADMIN — PROPOFOL 200 MCG/KG/MIN: 10 INJECTION, EMULSION INTRAVENOUS at 07:38

## 2019-12-19 ASSESSMENT — MIFFLIN-ST. JEOR: SCORE: 1933.9

## 2019-12-19 NOTE — H&P
"59 year old year old male here for colonoscopy for screening.    Patient Active Problem List   Diagnosis     Asthma     Personal history of arthritis     Vitamin D deficiency     Impotence of organic origin     Allergic rhinitis     DIANA (obstructive sleep apnea)       Past Medical History:   Diagnosis Date     Allergic rhinitis      Asthma      Impotence of organic origin      Personal history of arthritis      PONV (postoperative nausea and vomiting)      Unspecified vitamin D deficiency        Past Surgical History:   Procedure Laterality Date     ENDOSCOPIC RELEASE ULNAR NERVE (ELBOW) Right 10/27/2015    Procedure: ENDOSCOPIC RELEASE ULNAR NERVE (ELBOW);  Surgeon: Elvin Dey MD;  Location: [facility]     ENDOSCOPIC TRANSPOSITION ULNAR NERVE (ELBOW) Left 12/29/2015    Procedure: ENDOSCOPIC TRANSPOSITION ULNAR NERVE (ELBOW);  Surgeon: Elvin Dey MD;  Location: [facility]     NASAL/SINUS POLYPECTOMY Bilateral 2009    [facility]     ORTHOPEDIC SURGERY       RELEASE CARPAL TUNNEL  10/27/2015    Procedure: RELEASE CARPAL TUNNEL;  Surgeon: Elvin Dey MD;  Location: [facility]     RELEASE CARPAL TUNNEL Left 12/29/2015    Procedure: RELEASE CARPAL TUNNEL;  Surgeon: Elvin Dey MD;  Location: [facility] OR       @[facility]@     current outpatient medications on file.       Allergies   Allergen Reactions     Soma [Carisoprodol] Anaphylaxis     Amoxicillin Rash       Pt reports that he quit smoking about 15 years ago. He has a 30.00 pack-year smoking history. He has never used smokeless tobacco. He reports current alcohol use. He reports that he does not use drugs.    Exam:  /85   Temp 97.7  F (36.5  C) (Oral)   Resp 18   Ht 1.905 m (6' 3\")   Wt 104.3 kg (230 lb)   SpO2 98%   BMI 28.75 kg/m      Awake, Alert OX3  Lungs - CTA bilaterally  CV - RRR, no murmurs, distal pulses intact  Abd - soft, non-distended, non-tender, +BS  Extr - No cyanosis or edema    A/P 59 year old year old male in need of " colonoscopy for screening. Risks, benefits, alternatives, and complications were discussed including the possibility of perforation and the patient agreed to proceed    Joshua Sargent MD

## 2019-12-19 NOTE — BRIEF OP NOTE
[facility]   Brief Operative Note    Pre-operative diagnosis: Special screening for malignant neoplasms, colon [Z12.11]   Post-operative diagnosis mild diverticulosis, three polyps     Procedure: Procedure(s):  COLONOSCOPY   Surgeon(s): Surgeon(s) and Role:     * Joshua Sargent MD - Primary   Estimated blood loss: * No values recorded between 12/19/2019 12:00 AM and 12/19/2019  7:58 AM *    Specimens: ID Type Source Tests Collected by Time Destination   A : at 50cm Polyp Colon SURGICAL PATHOLOGY EXAM Joshua Sargent MD 12/19/2019  7:52 AM    B : at 40cm Polyp Colon SURGICAL PATHOLOGY EXAM Joshua Sargent MD 12/19/2019  7:54 AM    C : at 20cm Polyp Colon SURGICAL PATHOLOGY EXAM Joshua Sargent MD 12/19/2019  7:55 AM       Findings: 1. Three polyps as outlined  2. Mild sigmoid diverticulosis (scattered)  3. Colon otherwise normal

## 2019-12-19 NOTE — ANESTHESIA POSTPROCEDURE EVALUATION
Patient: Akira Fair    Procedure(s):  COLONOSCOPY    Diagnosis:Special screening for malignant neoplasms, colon [Z12.11]  Diagnosis Additional Information: No value filed.    Anesthesia Type:  General    Note:  Anesthesia Post Evaluation    Patient location during evaluation: Bedside  Patient participation: Able to fully participate in evaluation  Level of consciousness: awake and alert  Pain management: adequate  Airway patency: patent  Cardiovascular status: acceptable  Respiratory status: acceptable  Hydration status: acceptable  PONV: none     Anesthetic complications: None          Last vitals:  Vitals:    12/19/19 0637   BP: 137/85   Resp: 18   Temp: 36.5  C (97.7  F)   SpO2: 98%         Electronically Signed By: Brenda Pope CRNA, APRN CRNA  December 19, 2019  7:58 AM

## 2019-12-23 LAB — COPATH REPORT: NORMAL

## 2020-10-08 ENCOUNTER — APPOINTMENT (OUTPATIENT)
Dept: GENERAL RADIOLOGY | Facility: CLINIC | Age: 60
End: 2020-10-08
Attending: EMERGENCY MEDICINE
Payer: COMMERCIAL

## 2020-10-08 ENCOUNTER — HOSPITAL ENCOUNTER (EMERGENCY)
Facility: CLINIC | Age: 60
Discharge: ANOTHER HOSPITAL | End: 2020-10-08
Attending: EMERGENCY MEDICINE | Admitting: EMERGENCY MEDICINE
Payer: COMMERCIAL

## 2020-10-08 VITALS
SYSTOLIC BLOOD PRESSURE: 171 MMHG | BODY MASS INDEX: 29.37 KG/M2 | TEMPERATURE: 98.1 F | WEIGHT: 235 LBS | HEART RATE: 61 BPM | DIASTOLIC BLOOD PRESSURE: 103 MMHG | OXYGEN SATURATION: 97 % | RESPIRATION RATE: 14 BRPM

## 2020-10-08 DIAGNOSIS — S61.229A: ICD-10-CM

## 2020-10-08 DIAGNOSIS — M65.949 TENOSYNOVITIS OF FINGER: ICD-10-CM

## 2020-10-08 PROCEDURE — 99285 EMERGENCY DEPT VISIT HI MDM: CPT | Performed by: EMERGENCY MEDICINE

## 2020-10-08 PROCEDURE — 73140 X-RAY EXAM OF FINGER(S): CPT | Mod: LT

## 2020-10-08 PROCEDURE — 99285 EMERGENCY DEPT VISIT HI MDM: CPT | Mod: 25 | Performed by: EMERGENCY MEDICINE

## 2020-10-08 ASSESSMENT — ENCOUNTER SYMPTOMS
SHORTNESS OF BREATH: 0
ABDOMINAL PAIN: 0
FEVER: 0

## 2020-10-08 NOTE — ED PROVIDER NOTES
History     Chief Complaint   Patient presents with     Wound Infection     forgien body in left index finger     HPI  Akira Fair is a 59 year old male who is left-hand dominant, presenting to the emergency department with concerns regarding increased amounts of pain, and swelling of the left index finger.  Patient had injury which occurred 8 days ago after patient had cut his finger on a piece of metal while working in the garage.  Patient cleaned this out right away, and has been applying bacitracin, and soaking it.  However, patient had increased amounts of pain, redness, and swelling that began over the weekend, 3 days ago.  This resulted in telemedicine visit on October 6, 2020.  Patient was started on Keflex, 500 mg 3 times daily, with warm soaks.  Patient had been instructed to present to the emergency department if not improving.  Patient reports ongoing pain, with some pain which is now radiating towards the base of the hand.  Therefore, patient presents to the emergency department today.  No right hand symptoms.  Patient did not have x-ray images that were performed.  No prior surgery.    Allergies:  Allergies   Allergen Reactions     Soma [Carisoprodol] Anaphylaxis     Amoxicillin Rash       Problem List:    Patient Active Problem List    Diagnosis Date Noted     DIANA (obstructive sleep apnea) 10/22/2013     Priority: Medium     Mild DIANA (HST on 10/21/2013 with AHI 9.4, odalys desat 84%)       Asthma      Priority: Medium     Personal history of arthritis      Priority: Medium     Vitamin D deficiency      Priority: Medium     Problem list name updated by automated process. Provider to review       Impotence of organic origin      Priority: Medium     Allergic rhinitis      Priority: Medium        Past Medical History:    Past Medical History:   Diagnosis Date     Allergic rhinitis      Asthma      Impotence of organic origin      Personal history of arthritis      PONV (postoperative nausea and  vomiting)      Unspecified vitamin D deficiency        Past Surgical History:    Past Surgical History:   Procedure Laterality Date     COLONOSCOPY N/A 2019    Procedure: COLONOSCOPY, WITH POLYPECTOMY AND BIOPSY;  Surgeon: Joshua Sargent MD;  Location: [facility]     ENDOSCOPIC RELEASE ULNAR NERVE (ELBOW) Right 10/27/2015    Procedure: ENDOSCOPIC RELEASE ULNAR NERVE (ELBOW);  Surgeon: Elvin Dey MD;  Location: [facility]     ENDOSCOPIC TRANSPOSITION ULNAR NERVE (ELBOW) Left 2015    Procedure: ENDOSCOPIC TRANSPOSITION ULNAR NERVE (ELBOW);  Surgeon: Elvin Dey MD;  Location: [facility]     NASAL/SINUS POLYPECTOMY Bilateral 2009    [facility]     ORTHOPEDIC SURGERY       RELEASE CARPAL TUNNEL  10/27/2015    Procedure: RELEASE CARPAL TUNNEL;  Surgeon: Elvin Dey MD;  Location: [facility]     RELEASE CARPAL TUNNEL Left 2015    Procedure: RELEASE CARPAL TUNNEL;  Surgeon: Elvin Dey MD;  Location: [facility]       Family History:    Family History   Problem Relation Age of Onset     Cancer Mother      Coronary Artery Disease Father         MI       Social History:  Marital Status:   [2]  Social History     Tobacco Use     Smoking status: Former Smoker     Packs/day: 1.00     Years: 30.00     Pack years: 30.00     Quit date: 2004     Years since quittin.5     Smokeless tobacco: Never Used   Substance Use Topics     Alcohol use: Yes     Comment: occas     Drug use: No        Medications:         ALBUTEROL SULFATE  (90 BASE) MCG/ACT IN AERS       aspirin - buffered (ASCRIPTIN) 325 MG TABS tablet       aspirin 81 MG EC tablet       atorvastatin (LIPITOR) 10 MG tablet       EPINEPHrine (EPIPEN/ADRENACLICK/OR ANY BX GENERIC EQUIV) 0.3 MG/0.3ML injection 2-pack       fluticasone (FLONASE) 50 MCG/ACT nasal spray       loratadine (CLARITIN) 10 MG tablet       Losartan Potassium (COZAAR PO)       magnesium oxide 400 MG CAPS       sildenafil (REVATIO) 20 MG tablet          Review  of Systems   Constitutional: Negative for fever.   Respiratory: Negative for shortness of breath.    Cardiovascular: Negative for chest pain.   Gastrointestinal: Negative for abdominal pain.   Musculoskeletal:        See HPI   All other systems reviewed and are negative.      Physical Exam   BP: (!) 140/97  Pulse: 71  Temp: 98.1  F (36.7  C)  Resp: 14  Weight: 106.6 kg (235 lb)  SpO2: 95 %      Physical Exam  BP (!) 140/97   Pulse 71   Temp 98.1  F (36.7  C) (Oral)   Resp 14   Wt 106.6 kg (235 lb)   SpO2 95%   BMI 29.37 kg/m    General: alert, interactive, in no apparent distress  Head: atraumatic  Lungs: non-labored  CV: RRR, S1/S2; peripheral pulses palpable and symmetric  Abdomen: soft, nt, nd, no guarding or rebound. Positive bowel sounds  Extremities: no cyanosis or edema          Finger as above, held in slightly flexed position, diffusely inflamed, with pain with passive range of motion, unable to flex significantly  Skin: no rash or diaphoresis  Neuro:   sensation intact to light touch in UE and LEs bilaterally;       ED Course        Procedures               Critical Care time:  none               Results for orders placed or performed during the hospital encounter of 10/08/20 (from the past 24 hour(s))   Fingers XR, 2-3 views, left    Narrative    EXAM: XR FINGER LT G/E 2 VW  LOCATION: [facility]  DATE/TIME: 10/8/2020 1:23 AM    INDICATION: Pain. Infection. Laceration one week ago.  COMPARISON: None.      Impression    IMPRESSION: 3 views of the right index finger. No fracture or dislocation. There is a 1 mm radiopaque foreign body at the volar aspect of the middle phalanx.         Medications - No data to display    Assessments & Plan (with Medical Decision Making)  59 year old male, left-hand dominant, presenting to the emergency department after patient suffered injury about 8 days ago during which time he cut himself on metal working in the garage.  Patient has attempted to soak  this, and applied bacitracin, however has had increased amounts of pain, redness, and swelling.  Patient was started on Keflex, and has had approximately 6 total doses of Keflex, however continues with worsening pain, swelling, and now radiation of the pain towards the middle of the hand/wrist.  Given his diffuse sausagelike swelling, slightly flexed position, with pain with passive range of motion, erythematous nature, concern is for tenosynovitis.  X-ray images were obtained given the patient's history of laceration secondary to metal.  There is 1 mm radiopaque foreign body over the volar aspect of the middle phalanx of the left index finger.  Images personally reviewed in addition to radiology interpretation.    I did discuss with [facility] orthopedics provider, Dr. Pérez at 0152.  No orthopedic provider available for hand coverage at [facility].  Therefore, I discussed with patient, who requested transfer to [facility].  I discussed with Dr. Wagoner, hand surgeon who agreed to accept patient in transfer.  I discussed with Dr. Soto in the emergency department, who agreed to accept patient to the emergency department.  Patient will remain n.p.o.  Tetanus is up-to-date, last received in 2012.  Patient has already been on antibiotics for 6 total doses, and despite this has progression.  Hand surgeon stated no additional antibiotics required.  Concern is that foreign body is nidus for infection resulting in tenosynovitis.     I have reviewed the nursing notes.    I have reviewed the findings, diagnosis, plan and need for follow up with the patient.       New Prescriptions    No medications on file       Final diagnoses:   Tenosynovitis of finger   Laceration of finger with foreign body, initial encounter       10/8/2020   [facility] EMERGENCY DEPT     Joshua Ding MD  10/08/20 0230       Joshua Ding MD  10/08/20 0234

## 2020-10-08 NOTE — ED TRIAGE NOTES
Patient states was working in his garage about a week ago and got a sliver of something in left index finger thought he got it out. A few days ago patient had tele visit with MD because he thought finger was infected was started on ABX 2 days ago. States pain woke him up tonight and it is getting worse.

## 2021-03-13 ENCOUNTER — IMMUNIZATION (OUTPATIENT)
Dept: FAMILY MEDICINE | Facility: CLINIC | Age: 61
End: 2021-03-13
Payer: COMMERCIAL

## 2021-03-13 ENCOUNTER — HEALTH MAINTENANCE LETTER (OUTPATIENT)
Age: 61
End: 2021-03-13

## 2021-03-13 PROCEDURE — 91301 PR COVID VAC MODERNA 100 MCG/0.5 ML IM: CPT

## 2021-03-13 PROCEDURE — 0011A PR COVID VAC MODERNA 100 MCG/0.5 ML IM: CPT

## 2021-04-10 ENCOUNTER — IMMUNIZATION (OUTPATIENT)
Dept: FAMILY MEDICINE | Facility: CLINIC | Age: 61
End: 2021-04-10
Attending: FAMILY MEDICINE
Payer: COMMERCIAL

## 2021-04-10 PROCEDURE — 0012A PR COVID VAC MODERNA 100 MCG/0.5 ML IM: CPT

## 2021-04-10 PROCEDURE — 91301 PR COVID VAC MODERNA 100 MCG/0.5 ML IM: CPT

## 2021-10-23 ENCOUNTER — HEALTH MAINTENANCE LETTER (OUTPATIENT)
Age: 61
End: 2021-10-23

## 2022-02-11 ENCOUNTER — TRANSFERRED RECORDS (OUTPATIENT)
Dept: HEALTH INFORMATION MANAGEMENT | Facility: CLINIC | Age: 62
End: 2022-02-11

## 2022-04-09 ENCOUNTER — HEALTH MAINTENANCE LETTER (OUTPATIENT)
Age: 62
End: 2022-04-09

## 2022-10-09 ENCOUNTER — HEALTH MAINTENANCE LETTER (OUTPATIENT)
Age: 62
End: 2022-10-09

## 2023-05-27 ENCOUNTER — HEALTH MAINTENANCE LETTER (OUTPATIENT)
Age: 63
End: 2023-05-27

## 2024-02-07 ENCOUNTER — MEDICAL CORRESPONDENCE (OUTPATIENT)
Dept: HEALTH INFORMATION MANAGEMENT | Facility: CLINIC | Age: 64
End: 2024-02-07
Payer: COMMERCIAL

## 2024-02-07 ENCOUNTER — TRANSFERRED RECORDS (OUTPATIENT)
Dept: HEALTH INFORMATION MANAGEMENT | Facility: CLINIC | Age: 64
End: 2024-02-07
Payer: COMMERCIAL

## 2024-02-07 ENCOUNTER — LAB REQUISITION (OUTPATIENT)
Dept: LAB | Facility: CLINIC | Age: 64
End: 2024-02-07

## 2024-02-07 DIAGNOSIS — Z12.5 ENCOUNTER FOR SCREENING FOR MALIGNANT NEOPLASM OF PROSTATE: ICD-10-CM

## 2024-02-07 DIAGNOSIS — I25.10 ATHEROSCLEROTIC HEART DISEASE OF NATIVE CORONARY ARTERY WITHOUT ANGINA PECTORIS: ICD-10-CM

## 2024-02-07 PROCEDURE — 80053 COMPREHEN METABOLIC PANEL: CPT | Performed by: PHYSICIAN ASSISTANT

## 2024-02-07 PROCEDURE — 80061 LIPID PANEL: CPT | Performed by: PHYSICIAN ASSISTANT

## 2024-02-07 PROCEDURE — G0103 PSA SCREENING: HCPCS | Performed by: PHYSICIAN ASSISTANT

## 2024-02-08 LAB
ALBUMIN SERPL BCG-MCNC: 4.3 G/DL (ref 3.5–5.2)
ALP SERPL-CCNC: 90 U/L (ref 40–150)
ALT SERPL W P-5'-P-CCNC: 75 U/L (ref 0–70)
ANION GAP SERPL CALCULATED.3IONS-SCNC: 13 MMOL/L (ref 7–15)
AST SERPL W P-5'-P-CCNC: 48 U/L (ref 0–45)
BILIRUB SERPL-MCNC: 0.5 MG/DL
BUN SERPL-MCNC: 18.3 MG/DL (ref 8–23)
CALCIUM SERPL-MCNC: 9.4 MG/DL (ref 8.8–10.2)
CHLORIDE SERPL-SCNC: 102 MMOL/L (ref 98–107)
CHOLEST SERPL-MCNC: 112 MG/DL
CREAT SERPL-MCNC: 1.01 MG/DL (ref 0.67–1.17)
DEPRECATED HCO3 PLAS-SCNC: 24 MMOL/L (ref 22–29)
EGFRCR SERPLBLD CKD-EPI 2021: 83 ML/MIN/1.73M2
FASTING STATUS PATIENT QL REPORTED: NORMAL
GLUCOSE SERPL-MCNC: 98 MG/DL (ref 70–99)
HDLC SERPL-MCNC: 41 MG/DL
LDLC SERPL CALC-MCNC: 49 MG/DL
NONHDLC SERPL-MCNC: 71 MG/DL
POTASSIUM SERPL-SCNC: 4.1 MMOL/L (ref 3.4–5.3)
PROT SERPL-MCNC: 7.3 G/DL (ref 6.4–8.3)
PSA SERPL DL<=0.01 NG/ML-MCNC: 2.84 NG/ML (ref 0–4.5)
SODIUM SERPL-SCNC: 139 MMOL/L (ref 135–145)
TRIGL SERPL-MCNC: 109 MG/DL

## 2024-03-16 ENCOUNTER — HEALTH MAINTENANCE LETTER (OUTPATIENT)
Age: 64
End: 2024-03-16

## 2024-03-26 ENCOUNTER — OFFICE VISIT (OUTPATIENT)
Dept: CARDIOLOGY | Facility: CLINIC | Age: 64
End: 2024-03-26
Payer: COMMERCIAL

## 2024-03-26 VITALS
WEIGHT: 240 LBS | RESPIRATION RATE: 14 BRPM | DIASTOLIC BLOOD PRESSURE: 72 MMHG | HEART RATE: 60 BPM | SYSTOLIC BLOOD PRESSURE: 128 MMHG | BODY MASS INDEX: 30 KG/M2

## 2024-03-26 DIAGNOSIS — I49.3 PVC'S (PREMATURE VENTRICULAR CONTRACTIONS): Primary | ICD-10-CM

## 2024-03-26 DIAGNOSIS — E78.5 HYPERLIPIDEMIA, UNSPECIFIED HYPERLIPIDEMIA TYPE: ICD-10-CM

## 2024-03-26 DIAGNOSIS — G47.33 OSA (OBSTRUCTIVE SLEEP APNEA): ICD-10-CM

## 2024-03-26 DIAGNOSIS — I10 ESSENTIAL HYPERTENSION: ICD-10-CM

## 2024-03-26 DIAGNOSIS — I25.10 NONOBSTRUCTIVE ATHEROSCLEROSIS OF CORONARY ARTERY: ICD-10-CM

## 2024-03-26 PROCEDURE — 99204 OFFICE O/P NEW MOD 45 MIN: CPT | Performed by: GENERAL ACUTE CARE HOSPITAL

## 2024-03-26 PROCEDURE — G2211 COMPLEX E/M VISIT ADD ON: HCPCS | Performed by: GENERAL ACUTE CARE HOSPITAL

## 2024-03-26 RX ORDER — LOSARTAN POTASSIUM 50 MG/1
50 TABLET ORAL DAILY
Qty: 90 TABLET | Refills: 3 | Status: SHIPPED | OUTPATIENT
Start: 2024-03-26

## 2024-03-26 RX ORDER — ATORVASTATIN CALCIUM 10 MG/1
5 TABLET, FILM COATED ORAL EVERY MORNING
Qty: 45 TABLET | Refills: 3 | Status: SHIPPED | OUTPATIENT
Start: 2024-03-26

## 2024-03-26 NOTE — LETTER
3/26/2024    Steven Duane Semmler, MD  [address] [facility] [address]    RE: Akira Fair       Dear Colleague,     I had the pleasure of seeing Akira Fair in the [facility].  HEART CARE ENCOUNTER NOTE      Thank you, Dr. Payton, Steven Duane, for asking the [facility] team to see Mr. Akira Fair to evaluate premature ventricular contractions.    Assessment/Recommendations   Assessment:    Fatigue and perceived impaired functional capacity which I do not feel is a result of cardiac disease or a medication side effect. He does not have congestive heart failure, obstructive coronary artery disease or a significantly high arrhythmia burden.  Premature ventricular contractions which are currently infrequent but possibly still causing symptoms.  Nonischemic cardiomyopathy noted only once on a nuclear stress test 9/17/2010. It is possible that this was an inaccurate result as no other study has indicated an impaired left ventricular ejection fraction and is premature ventricular contraction burden has never been sufficiently high to cause cardiomyopathy.  Nonobstructive coronary artery disease seen on coronary angiography 2/11/2022.  Essential hypertension. Controlled.  Hyperlipidemia.  Obstructive sleep apnea on CPAP.  Body mass index is 30 kg/m .    Plan:  Continue aspirin 81 mg and atorvastatin 5 mg daily.  Continue losartan 50 mg daily.  He did not perceive a benefit from beta blocker therapy in the past and as his arrhythmia burden seems low now, we can continue to stay off beta blockers.  If he notes an increase in palpitations, we can consider a longer duration Zio patch to assess for paroxysmal arrhythmias such as atrial fibrillation as well as to re-quantify his premature ventricular contraction burden.  Follow-up with me in 1 year.       The longitudinal plan of care for the diagnosis(es)/condition(s) as documented were addressed during this visit. Due to the added  complexity in care, I will continue to support Akira in the subsequent management and with ongoing continuity of care.    Essential hypertension.  Premature ventricular contractions.  Coronary artery disease.    History of Present Illness   Mr. Akira Fair is a 63 year old male with a significant past history of PVCs, nonobstructive CAD, and HTN presenting to establish care. He previously has followed with [facility].    He initially presented in 2010 with palpitations. A Holter monitor at that time showed 3% PVCs. Shortly after on 9/17/2010 he had a nuclear stress test which showed no ischemia or infarct but did note an EF of 47%.    It does not seem he had regular cardiology follow-up again until 2018. A TTE done then showed his LVEF to be normal. Repeat cardiac monitoring has shown only occasional PVCs although possible correlating with palpitations. However, he was on atelolol for sometime and did not have any improvement. He has other times where his heart feels like it is racing for a while but no sustained arrhythmias have been seen on cardiac monitoring. A repeat stress test in 2022 suggested possible infarction but coronary angiography 2/11/2022 showed nonobstructive CAD.    He has felt he tired with activity for at least the last year. He uses CPAP regularly and does not feel his energy improved with initiation of this. He denies exertional chest pain/pressure/tightness, shortness of breath at rest or with exertion, light headedness/dizziness, pre-syncope, syncope, lower extremity swelling, paroxysmal nocturnal dyspnea (PND), or orthopnea.     Cardiac Problems and Cardiac Diagnostics     Most Recent Cardiac testing:  ECG dated 2/11/2022 (personaly reviewed and interpreted): SR with 1st degree AV bloc  ms, nonspecific T wave abnormality, nonspecific IVCD with QRS duration 120 ms    Holter monitor 2/2/2022 (report reviewed):  The basic mechanism of the rhythm was sinus rhythm with a 1st degree AVB  and an average heart rate of 61 beats per minute. Heart rate varied from 41 to 100 bpm.  Atrial ectopy was rare, consisting of less than 1% of all recorded beats. This consisted of only single PACs and 2 atrial pairs.  Ventricular ectopy was rare, consisting of less than 1% of all recorded beats. This consisted of single PVCs and 1 couplet.  Diary was returned with the following cardiovascular symptoms of skipped beat and pounding in chest. Skipped beat correlated with sinus rhythm with a single PVC and an average rate of 69 bpm. Pounding in chest correlated with sinus rhythm without ectopy and an average rate of 81 bpm.   In summary, Holter shows sinus rhythm with a 1st degree AVB and rare non-complex ectopy. Cardiovascular symptoms of skipped beat and pounding in chest were reported. Skipped beat correlated with sinus rhythm with a single PVC and an average rate of 69 bpm. Pounding in chest correlated with sinus rhythm without ectopy and an average rate of 81 bpm.    Holter monitor 8/4/2010 (report reviewed):  Impression: Normal sinus rhythm with moderately frequent PVCs representing 3% of his beats and these are unifocal and almost all are isolated.    ECHO 2/2/2022 (report reviewed):   1.  Normal left ventricular chamber size.  Calculated left ventricular ejection fraction (modified Rhodes technique) is 60 %.   2.  Mild right ventricular chamber enlargement.  Normal right ventricular systolic function.   3.  Mild mitral valve regurgitation.   4.  Aortic sinus of Valsalva is mildly dilated (4.4 cm, ZScore = 2.4).   5.  When compared to the previous echocardiographic report of 9/16/2020, the aortic root is mildly dilated.     Stress test 2/1/2022 (report reviewed):   Abnormal pharmacologic stress perfusion imaging study.   Images demonstrated a very small sized, fixed perfusion abnormality in the distal inferolateral wall(s), consistent with infarction.   Increased left ventricular size with normal systolic  function with a calculated LVEF of 54%.   Frequent PVCs ,occasional couplets, Rare triplets, Occasional APCs, .   Exercise protocol was terminated due to knee pain and submaximal heart rate.     Stress test 9/17/2010 (report reviewed):  Perfusion: Normal study. No evidence of ischemia or infarction.  Function: Lower limits of normal. The gated ejection fraction is 47%.    Coronary CT angiogram 10/14/2010 (report reviewed):  1. Total calcium score zero. No detectable calcium meeting criteria is identified.  2. Coronary CT angiogram: Right coronary artery dominant system.  3. Left main: Normal.  4. Left anterior descending: Diffuse narrowing of the LAD in its mid portion suggesting the possibility of myocardial  bridging. No detectable atherosclerotic plaque noted in the LAD or diagonal branches.  5. Ramus intermedius: Moderate to large sized vessel without detectable atherosclerotic plaque or stenosis noted.  6. Circumflex: Nondominant. No detectable atherosclerotic plaque or stenosis noted.  7. Right coronary artery: Large dominant vessel. Mild luminal irregularities without significant detectable  atherosclerotic plaque or stenosis noted.  8. Please review radiology portion of incidental noncardiac findings from [facility] below.    Cardiac cath 2/11/2022 (report reviewed):   DIAGNOSTIC - CORONARY   Mild coronary disease. No high grade coronary lesions   The left main artery is large in size.   The left main artery was normal in appearance and free of obstructive disease.   The LAD is large in size.  Proximal and mid segments are large and normal.  The first diagonal branch is very large, long and normal.   The LAD has mild disease in the mid distal segment where there is about a 2.5 cm long segment of < 50% smooth narrowing.   40% stenosis in the distal LAD   The Circumflex is large in size.   The circumflex artery was normal in appearance and free of obstructive disease.   The RCA is large in size.   The  RCA was normal in appearance and free of obstructive disease.     HEMODYNAMICS   The LVEDP is within normal limits      Medications  Allergies   Current Outpatient Medications   Medication Sig Dispense Refill    ALBUTEROL SULFATE  (90 BASE) MCG/ACT IN AERS 2 puffs AS DIRECTED      aspirin - buffered (ASCRIPTIN) 325 MG TABS tablet Take 325 mg by mouth once      aspirin 81 MG EC tablet Take 81 mg by mouth daily      atorvastatin (LIPITOR) 10 MG tablet Take 5 mg by mouth every morning       EPINEPHrine (EPIPEN/ADRENACLICK/OR ANY BX GENERIC EQUIV) 0.3 MG/0.3ML injection 2-pack Inject 0.3 mg into the muscle      fluticasone (FLONASE) 50 MCG/ACT nasal spray Spray 1-2 sprays into both nostrils daily 1 Package 11    loratadine (CLARITIN) 10 MG tablet Take 1 tablet (10 mg) by mouth daily 30 tablet 1    Losartan Potassium (COZAAR PO) Take 50 mg by mouth      magnesium oxide 400 MG CAPS Take 250 mg by mouth every morning       sildenafil (REVATIO) 20 MG tablet Take 1-5 tablet as needed for erectile dysfunction        Allergies   Allergen Reactions    Soma [Carisoprodol] Anaphylaxis    Amoxicillin Rash        Physical Examination Review of Systems   /72 (BP Location: Right arm, Patient Position: Sitting, Cuff Size: Adult Regular)   Pulse 60   Resp 14   Wt 108.9 kg (240 lb)   BMI 30.00 kg/m    Body mass index is 30 kg/m .  Wt Readings from Last 3 Encounters:   03/26/24 108.9 kg (240 lb)   10/08/20 106.6 kg (235 lb)   12/19/19 104.3 kg (230 lb)       General Appearance:   Pleasant  male, appears  stated age. no acute distress, normal body habitus   ENT/Mouth: membranes moist, no apparent gingival bleeding.      EYES:  no scleral icterus, normal conjunctivae   Neck: no carotid bruits. No anterior cervical lymphadenopaty   Respiratory:   lungs are clear to auscultation, no rales or wheezing,  equal chest wall expansion    Cardiovascular:   Regular rhythm, normal rate. Normal first and second heart sounds with no  murmurs, rubs, or gallops; the carotid, radial and posterior tibial pulses are intact, Jugular venous pressure normal, no edema bilaterally    Abdomen/GI:  no organomegaly, masses, bruits, or tenderness; bowel sounds are present   Extremities: no cyanosis or clubbing   Skin: no xanthelasma, warm.    Heme/lymph/ Immunology No apparent bleeding noted.   Neurologic: Alert and oriented. normal gait, no tremors     Psychiatric: Pleasant, calm, appropriate affect.    A complete 10 system review of systems was performed and is negative except as mentioned in the HPI/subjective.         Past History   Past Medical History:   Past Medical History:   Diagnosis Date    Allergic rhinitis     Asthma     Impotence of organic origin     Personal history of arthritis     PONV (postoperative nausea and vomiting)     Unspecified vitamin D deficiency        Past Surgical History:   Past Surgical History:   Procedure Laterality Date    COLONOSCOPY N/A 12/19/2019    Procedure: COLONOSCOPY, WITH POLYPECTOMY AND BIOPSY;  Surgeon: Joshua Sargent MD;  Location: [facility]    ENDOSCOPIC RELEASE ULNAR NERVE (ELBOW) Right 10/27/2015    Procedure: ENDOSCOPIC RELEASE ULNAR NERVE (ELBOW);  Surgeon: Elvin Dey MD;  Location: [facility]    ENDOSCOPIC TRANSPOSITION ULNAR NERVE (ELBOW) Left 12/29/2015    Procedure: ENDOSCOPIC TRANSPOSITION ULNAR NERVE (ELBOW);  Surgeon: Elvin Dey MD;  Location: [facility]    NASAL/SINUS POLYPECTOMY Bilateral 2009    [facility]    ORTHOPEDIC SURGERY      RELEASE CARPAL TUNNEL  10/27/2015    Procedure: RELEASE CARPAL TUNNEL;  Surgeon: Elvin Dey MD;  Location: [facility]    RELEASE CARPAL TUNNEL Left 12/29/2015    Procedure: RELEASE CARPAL TUNNEL;  Surgeon: Elvin Dey MD;  Location: [facility]       Family History:   Family History   Problem Relation Age of Onset    Cancer Mother     Coronary Artery Disease Father         MI        Social History:   Social History     Socioeconomic History    Marital  status:      Spouse name: Not on file    Number of children: Not on file    Years of education: Not on file    Highest education level: Not on file   Occupational History    Not on file   Tobacco Use    Smoking status: Former     Packs/day: 1.00     Years: 30.00     Additional pack years: 0.00     Total pack years: 30.00     Types: Cigarettes     Quit date: 2004     Years since quittin.9    Smokeless tobacco: Never   Substance and Sexual Activity    Alcohol use: Yes     Comment: occas    Drug use: No    Sexual activity: Not on file   Other Topics Concern    Parent/sibling w/ CABG, MI or angioplasty before 65F 55M? Not Asked   Social History Narrative    Not on file     Social Determinants of Health     Financial Resource Strain: Not on file   Food Insecurity: Not on file   Transportation Needs: Not on file   Physical Activity: Not on file   Stress: Not on file   Social Connections: Not on file   Interpersonal Safety: Not on file   Housing Stability: Not on file              Lab Results    Chemistry/lipid CBC Cardiac Enzymes/BNP/TSH/INR   Lab Results   Component Value Date    CHOL 112 2024    HDL 41 2024    LDL 49 2024    TRIG 109 2024    CR 1.01 2024    BUN 18.3 2024    POTASSIUM 4.1 2024     2024    CO2 24 2024      Lab Results   Component Value Date    WBC 10.7 2018    HGB 16.2 2018    HCT 47.0 2018    MCV 91 2018     2018             Dennis Kingsley MD [facility]  Non-Invasive Cardiologist  [facility]  Pager [phone]      Thank you for allowing me to participate in the care of your patient.      Sincerely,     Dennis Kingsley MD     [facility]  cc:   Steven Duane Semmler, MD  [address] [place] [address]

## 2024-03-26 NOTE — PROGRESS NOTES
HEART CARE ENCOUNTER NOTE      Thank you, Dr. Semmler, Steven Duane, for asking the [facility] team to see Mr. Akira Fair to evaluate premature ventricular contractions.    Assessment/Recommendations   Assessment:    Fatigue and perceived impaired functional capacity which I do not feel is a result of cardiac disease or a medication side effect. He does not have congestive heart failure, obstructive coronary artery disease or a significantly high arrhythmia burden.  Premature ventricular contractions which are currently infrequent but possibly still causing symptoms.  Nonischemic cardiomyopathy noted only once on a nuclear stress test 9/17/2010. It is possible that this was an inaccurate result as no other study has indicated an impaired left ventricular ejection fraction and is premature ventricular contraction burden has never been sufficiently high to cause cardiomyopathy.  Nonobstructive coronary artery disease seen on coronary angiography 2/11/2022.  Essential hypertension. Controlled.  Hyperlipidemia.  Obstructive sleep apnea on CPAP.  Body mass index is 30 kg/m .    Plan:  Continue aspirin 81 mg and atorvastatin 5 mg daily.  Continue losartan 50 mg daily.  He did not perceive a benefit from beta blocker therapy in the past and as his arrhythmia burden seems low now, we can continue to stay off beta blockers.  If he notes an increase in palpitations, we can consider a longer duration Zio patch to assess for paroxysmal arrhythmias such as atrial fibrillation as well as to re-quantify his premature ventricular contraction burden.  Follow-up with me in 1 year.       The longitudinal plan of care for the diagnosis(es)/condition(s) as documented were addressed during this visit. Due to the added complexity in care, I will continue to support Akira in the subsequent management and with ongoing continuity of care.    Essential hypertension.  Premature ventricular contractions.  Coronary artery  disease.    History of Present Illness   Mr. Akira Fair is a 63 year old male with a significant past history of PVCs, nonobstructive CAD, and HTN presenting to establish care. He previously has followed with [facility].    He initially presented in 2010 with palpitations. A Holter monitor at that time showed 3% PVCs. Shortly after on 9/17/2010 he had a nuclear stress test which showed no ischemia or infarct but did note an EF of 47%.    It does not seem he had regular cardiology follow-up again until 2018. A TTE done then showed his LVEF to be normal. Repeat cardiac monitoring has shown only occasional PVCs although possible correlating with palpitations. However, he was on atelolol for sometime and did not have any improvement. He has other times where his heart feels like it is racing for a while but no sustained arrhythmias have been seen on cardiac monitoring. A repeat stress test in 2022 suggested possible infarction but coronary angiography 2/11/2022 showed nonobstructive CAD.    He has felt he tired with activity for at least the last year. He uses CPAP regularly and does not feel his energy improved with initiation of this. He denies exertional chest pain/pressure/tightness, shortness of breath at rest or with exertion, light headedness/dizziness, pre-syncope, syncope, lower extremity swelling, paroxysmal nocturnal dyspnea (PND), or orthopnea.     Cardiac Problems and Cardiac Diagnostics     Most Recent Cardiac testing:  ECG dated 2/11/2022 (personaly reviewed and interpreted): SR with 1st degree AV bloc  ms, nonspecific T wave abnormality, nonspecific IVCD with QRS duration 120 ms    Holter monitor 2/2/2022 (report reviewed):  The basic mechanism of the rhythm was sinus rhythm with a 1st degree AVB and an average heart rate of 61 beats per minute. Heart rate varied from 41 to 100 bpm.  Atrial ectopy was rare, consisting of less than 1% of all recorded beats. This consisted of only single PACs  and 2 atrial pairs.  Ventricular ectopy was rare, consisting of less than 1% of all recorded beats. This consisted of single PVCs and 1 couplet.  Diary was returned with the following cardiovascular symptoms of skipped beat and pounding in chest. Skipped beat correlated with sinus rhythm with a single PVC and an average rate of 69 bpm. Pounding in chest correlated with sinus rhythm without ectopy and an average rate of 81 bpm.   In summary, Holter shows sinus rhythm with a 1st degree AVB and rare non-complex ectopy. Cardiovascular symptoms of skipped beat and pounding in chest were reported. Skipped beat correlated with sinus rhythm with a single PVC and an average rate of 69 bpm. Pounding in chest correlated with sinus rhythm without ectopy and an average rate of 81 bpm.    Holter monitor 8/4/2010 (report reviewed):  Impression: Normal sinus rhythm with moderately frequent PVCs representing 3% of his beats and these are unifocal and almost all are isolated.    ECHO 2/2/2022 (report reviewed):   1.  Normal left ventricular chamber size.  Calculated left ventricular ejection fraction (modified Rhodes technique) is 60 %.   2.  Mild right ventricular chamber enlargement.  Normal right ventricular systolic function.   3.  Mild mitral valve regurgitation.   4.  Aortic sinus of Valsalva is mildly dilated (4.4 cm, ZScore = 2.4).   5.  When compared to the previous echocardiographic report of 9/16/2020, the aortic root is mildly dilated.     Stress test 2/1/2022 (report reviewed):   Abnormal pharmacologic stress perfusion imaging study.   Images demonstrated a very small sized, fixed perfusion abnormality in the distal inferolateral wall(s), consistent with infarction.   Increased left ventricular size with normal systolic function with a calculated LVEF of 54%.   Frequent PVCs ,occasional couplets, Rare triplets, Occasional APCs, .   Exercise protocol was terminated due to knee pain and submaximal heart rate.     Stress  test 9/17/2010 (report reviewed):  Perfusion: Normal study. No evidence of ischemia or infarction.  Function: Lower limits of normal. The gated ejection fraction is 47%.    Coronary CT angiogram 10/14/2010 (report reviewed):  1. Total calcium score zero. No detectable calcium meeting criteria is identified.  2. Coronary CT angiogram: Right coronary artery dominant system.  3. Left main: Normal.  4. Left anterior descending: Diffuse narrowing of the LAD in its mid portion suggesting the possibility of myocardial  bridging. No detectable atherosclerotic plaque noted in the LAD or diagonal branches.  5. Ramus intermedius: Moderate to large sized vessel without detectable atherosclerotic plaque or stenosis noted.  6. Circumflex: Nondominant. No detectable atherosclerotic plaque or stenosis noted.  7. Right coronary artery: Large dominant vessel. Mild luminal irregularities without significant detectable  atherosclerotic plaque or stenosis noted.  8. Please review radiology portion of incidental noncardiac findings from [facility] below.    Cardiac cath 2/11/2022 (report reviewed):   DIAGNOSTIC - CORONARY   Mild coronary disease. No high grade coronary lesions   The left main artery is large in size.   The left main artery was normal in appearance and free of obstructive disease.   The LAD is large in size.  Proximal and mid segments are large and normal.  The first diagonal branch is very large, long and normal.   The LAD has mild disease in the mid distal segment where there is about a 2.5 cm long segment of < 50% smooth narrowing.   40% stenosis in the distal LAD   The Circumflex is large in size.   The circumflex artery was normal in appearance and free of obstructive disease.   The RCA is large in size.   The RCA was normal in appearance and free of obstructive disease.     HEMODYNAMICS   The LVEDP is within normal limits      Medications  Allergies   Current Outpatient Medications   Medication Sig Dispense  Refill    ALBUTEROL SULFATE  (90 BASE) MCG/ACT IN AERS 2 puffs AS DIRECTED      aspirin - buffered (ASCRIPTIN) 325 MG TABS tablet Take 325 mg by mouth once      aspirin 81 MG EC tablet Take 81 mg by mouth daily      atorvastatin (LIPITOR) 10 MG tablet Take 5 mg by mouth every morning       EPINEPHrine (EPIPEN/ADRENACLICK/OR ANY BX GENERIC EQUIV) 0.3 MG/0.3ML injection 2-pack Inject 0.3 mg into the muscle      fluticasone (FLONASE) 50 MCG/ACT nasal spray Spray 1-2 sprays into both nostrils daily 1 Package 11    loratadine (CLARITIN) 10 MG tablet Take 1 tablet (10 mg) by mouth daily 30 tablet 1    Losartan Potassium (COZAAR PO) Take 50 mg by mouth      magnesium oxide 400 MG CAPS Take 250 mg by mouth every morning       sildenafil (REVATIO) 20 MG tablet Take 1-5 tablet as needed for erectile dysfunction        Allergies   Allergen Reactions    Soma [Carisoprodol] Anaphylaxis    Amoxicillin Rash        Physical Examination Review of Systems   /72 (BP Location: Right arm, Patient Position: Sitting, Cuff Size: Adult Regular)   Pulse 60   Resp 14   Wt 108.9 kg (240 lb)   BMI 30.00 kg/m    Body mass index is 30 kg/m .  Wt Readings from Last 3 Encounters:   03/26/24 108.9 kg (240 lb)   10/08/20 106.6 kg (235 lb)   12/19/19 104.3 kg (230 lb)       General Appearance:   Pleasant  male, appears  stated age. no acute distress, normal body habitus   ENT/Mouth: membranes moist, no apparent gingival bleeding.      EYES:  no scleral icterus, normal conjunctivae   Neck: no carotid bruits. No anterior cervical lymphadenopaty   Respiratory:   lungs are clear to auscultation, no rales or wheezing,  equal chest wall expansion    Cardiovascular:   Regular rhythm, normal rate. Normal first and second heart sounds with no murmurs, rubs, or gallops; the carotid, radial and posterior tibial pulses are intact, Jugular venous pressure normal, no edema bilaterally    Abdomen/GI:  no organomegaly, masses, bruits, or tenderness;  bowel sounds are present   Extremities: no cyanosis or clubbing   Skin: no xanthelasma, warm.    Heme/lymph/ Immunology No apparent bleeding noted.   Neurologic: Alert and oriented. normal gait, no tremors     Psychiatric: Pleasant, calm, appropriate affect.    A complete 10 system review of systems was performed and is negative except as mentioned in the HPI/subjective.         Past History   Past Medical History:   Past Medical History:   Diagnosis Date    Allergic rhinitis     Asthma     Impotence of organic origin     Personal history of arthritis     PONV (postoperative nausea and vomiting)     Unspecified vitamin D deficiency        Past Surgical History:   Past Surgical History:   Procedure Laterality Date    COLONOSCOPY N/A 12/19/2019    Procedure: COLONOSCOPY, WITH POLYPECTOMY AND BIOPSY;  Surgeon: Joshua Sargent MD;  Location: [facility]    ENDOSCOPIC RELEASE ULNAR NERVE (ELBOW) Right 10/27/2015    Procedure: ENDOSCOPIC RELEASE ULNAR NERVE (ELBOW);  Surgeon: Elvin Dey MD;  Location: [facility]    ENDOSCOPIC TRANSPOSITION ULNAR NERVE (ELBOW) Left 12/29/2015    Procedure: ENDOSCOPIC TRANSPOSITION ULNAR NERVE (ELBOW);  Surgeon: Elvin Dey MD;  Location: [facility]    NASAL/SINUS POLYPECTOMY Bilateral 2009    [facility]    ORTHOPEDIC SURGERY      RELEASE CARPAL TUNNEL  10/27/2015    Procedure: RELEASE CARPAL TUNNEL;  Surgeon: Elvin Dey MD;  Location: [facility]    RELEASE CARPAL TUNNEL Left 12/29/2015    Procedure: RELEASE CARPAL TUNNEL;  Surgeon: Elvin Dey MD;  Location: [facility]       Family History:   Family History   Problem Relation Age of Onset    Cancer Mother     Coronary Artery Disease Father         MI        Social History:   Social History     Socioeconomic History    Marital status:      Spouse name: Not on file    Number of children: Not on file    Years of education: Not on file    Highest education level: Not on file   Occupational History    Not on file   Tobacco Use     Smoking status: Former     Packs/day: 1.00     Years: 30.00     Additional pack years: 0.00     Total pack years: 30.00     Types: Cigarettes     Quit date: 2004     Years since quittin.9    Smokeless tobacco: Never   Substance and Sexual Activity    Alcohol use: Yes     Comment: occas    Drug use: No    Sexual activity: Not on file   Other Topics Concern    Parent/sibling w/ CABG, MI or angioplasty before 65F 55M? Not Asked   Social History Narrative    Not on file     Social Determinants of Health     Financial Resource Strain: Not on file   Food Insecurity: Not on file   Transportation Needs: Not on file   Physical Activity: Not on file   Stress: Not on file   Social Connections: Not on file   Interpersonal Safety: Not on file   Housing Stability: Not on file              Lab Results    Chemistry/lipid CBC Cardiac Enzymes/BNP/TSH/INR   Lab Results   Component Value Date    CHOL 112 2024    HDL 41 2024    LDL 49 2024    TRIG 109 2024    CR 1.01 2024    BUN 18.3 2024    POTASSIUM 4.1 2024     2024    CO2 24 2024      Lab Results   Component Value Date    WBC 10.7 2018    HGB 16.2 2018    HCT 47.0 2018    MCV 91 2018     2018             Dennis Kingsley MD [facility]  Non-Invasive Cardiologist  [facility]  Pager [phone]

## 2024-06-10 ENCOUNTER — LAB REQUISITION (OUTPATIENT)
Dept: LAB | Facility: CLINIC | Age: 64
End: 2024-06-10

## 2024-06-10 DIAGNOSIS — N41.0 ACUTE PROSTATITIS: ICD-10-CM

## 2024-06-10 PROCEDURE — 87086 URINE CULTURE/COLONY COUNT: CPT | Performed by: PHYSICIAN ASSISTANT

## 2024-06-12 LAB — BACTERIA UR CULT: NO GROWTH

## 2024-10-23 ENCOUNTER — LAB REQUISITION (OUTPATIENT)
Dept: LAB | Facility: CLINIC | Age: 64
End: 2024-10-23

## 2024-10-23 DIAGNOSIS — I10 ESSENTIAL (PRIMARY) HYPERTENSION: ICD-10-CM

## 2024-10-23 LAB
ALBUMIN SERPL BCG-MCNC: 4.1 G/DL (ref 3.5–5.2)
ALP SERPL-CCNC: 96 U/L (ref 40–150)
ALT SERPL W P-5'-P-CCNC: 41 U/L (ref 0–70)
ANION GAP SERPL CALCULATED.3IONS-SCNC: 10 MMOL/L (ref 7–15)
AST SERPL W P-5'-P-CCNC: 31 U/L (ref 0–45)
BILIRUB SERPL-MCNC: 0.7 MG/DL
BUN SERPL-MCNC: 18.9 MG/DL (ref 8–23)
CALCIUM SERPL-MCNC: 9.1 MG/DL (ref 8.8–10.4)
CHLORIDE SERPL-SCNC: 104 MMOL/L (ref 98–107)
CREAT SERPL-MCNC: 1.01 MG/DL (ref 0.67–1.17)
EGFRCR SERPLBLD CKD-EPI 2021: 83 ML/MIN/1.73M2
GLUCOSE SERPL-MCNC: 121 MG/DL (ref 70–99)
HCO3 SERPL-SCNC: 24 MMOL/L (ref 22–29)
POTASSIUM SERPL-SCNC: 3.8 MMOL/L (ref 3.4–5.3)
PROT SERPL-MCNC: 7 G/DL (ref 6.4–8.3)
SODIUM SERPL-SCNC: 138 MMOL/L (ref 135–145)

## 2024-10-23 PROCEDURE — 80053 COMPREHEN METABOLIC PANEL: CPT | Performed by: PHYSICIAN ASSISTANT

## 2024-12-18 ENCOUNTER — OFFICE VISIT (OUTPATIENT)
Dept: CARDIOLOGY | Facility: CLINIC | Age: 64
End: 2024-12-18
Payer: COMMERCIAL

## 2024-12-18 VITALS
HEART RATE: 76 BPM | BODY MASS INDEX: 30.12 KG/M2 | WEIGHT: 241 LBS | RESPIRATION RATE: 16 BRPM | SYSTOLIC BLOOD PRESSURE: 138 MMHG | DIASTOLIC BLOOD PRESSURE: 86 MMHG

## 2024-12-18 DIAGNOSIS — G47.33 OSA (OBSTRUCTIVE SLEEP APNEA): ICD-10-CM

## 2024-12-18 DIAGNOSIS — I25.10 NONOBSTRUCTIVE ATHEROSCLEROSIS OF CORONARY ARTERY: ICD-10-CM

## 2024-12-18 DIAGNOSIS — I10 ESSENTIAL HYPERTENSION: ICD-10-CM

## 2024-12-18 DIAGNOSIS — I49.3 PVC'S (PREMATURE VENTRICULAR CONTRACTIONS): Primary | ICD-10-CM

## 2024-12-18 DIAGNOSIS — I77.89 AORTIC ROOT ENLARGEMENT (H): ICD-10-CM

## 2024-12-18 DIAGNOSIS — E78.5 HYPERLIPIDEMIA, UNSPECIFIED HYPERLIPIDEMIA TYPE: ICD-10-CM

## 2024-12-18 PROCEDURE — 99214 OFFICE O/P EST MOD 30 MIN: CPT | Performed by: GENERAL ACUTE CARE HOSPITAL

## 2024-12-18 PROCEDURE — G2211 COMPLEX E/M VISIT ADD ON: HCPCS | Performed by: GENERAL ACUTE CARE HOSPITAL

## 2024-12-18 NOTE — LETTER
12/18/2024    Yoni Gomez PA-C  [id number] [place] [address]    RE: Akira Fair       Dear Colleague,     I had the pleasure of seeing Akira Fair in the [facility].  HEART CARE ENCOUNTER NOTE        Assessment/Recommendations   Assessment:    Premature ventricular contractions noted to be infrequent on his last ambulatory cardiac monitor 2/2/2022 but possibly still causing symptoms. He did not perceive a benefit from beta blocker therapy.  Preoperative cardiovascular examination prior to holmium laser enucleation of the prostate scheduled for 2/20/2025. This is a low risk procedure and he is well-compensated from a cardiovascular standpoint, putting him a low risk of perioperative major adverse cardiac events.  Nonischemic cardiomyopathy noted only once on a nuclear stress test 9/17/2010. It is possible that this was an inaccurate result as no other study has indicated an impaired left ventricular ejection fraction and is premature ventricular contraction burden has never been sufficiently high to cause cardiomyopathy.  Non-aneurysmal enlargement of the aortic root measuring 4.4 cm on transthoracic echocardiogram 2/2/2022.  Nonobstructive coronary artery disease seen on coronary angiography 2/11/2022.  Fatigue and muscle aches. These are nonspecific symptoms although statin therapy could potentially be causing this.  Essential hypertension. Controlled.  Hyperlipidemia.  Obstructive sleep apnea on CPAP.  Body mass index is 30.12 kg/m .    Plan:  Transthoracic echocardiogram to reassess his aortic root dimensions.  I offered him prolonged cardiac monitoring with a 14-day Zio patch to see if there is any other cardiac arrhythmia we have not been able to capture on his short duration Holter monitors, but he is not interested in this currently.  Although unlikely I did suggest he try stopping his atorvastatin 10 mg daily tablet for 1-2 weeks to see if any of his complaints improve.  From  a cardiac standpoint he may proceed with is planned prostate procedure without need for further testing or intervention.  Continue losartan 50 mg daily.  He did not perceive a benefit from beta blocker therapy in the past and as his arrhythmia burden seems low now, we can continue to stay off beta blockers.  Follow-up with me in 1 year.       The longitudinal plan of care for the diagnosis(es)/condition(s) as documented were addressed during this visit. Due to the added complexity in care, I will continue to support Akira in the subsequent management and with ongoing continuity of care.    History of Present Illness   Mr. Akira Fair is a 64 year old male with a significant past history of PVCs, nonobstructive CAD, and HTN presenting for follow-up.    He continues to feel fatigued and gets out of breath easily. He has a lot of joint and muscle aches. He also feels hear palpitations a few days each month, which are random and intermittent.    No chest pain/pressure/tightness, shortness of breath at rest, light headedness/dizziness, pre-syncope, syncope, lower extremity swelling, paroxysmal nocturnal dyspnea (PND), or orthopnea.    He is getting a minimally invasive prostate procedure on 2/20/2025.    He initially presented in 2010 with palpitations. A Holter monitor at that time showed 3% PVCs. Shortly after on 9/17/2010 he had a nuclear stress test which showed no ischemia or infarct but did note an EF of 47%.     It does not seem he had regular cardiology follow-up again until 2018. A TTE done then showed his LVEF to be normal. Repeat cardiac monitoring has shown only occasional PVCs although possible correlating with palpitations. However, he was on atelolol for sometime and did not have any improvement. He has other times where his heart feels like it is racing for a while but no sustained arrhythmias have been seen on cardiac monitoring. A repeat stress test in 2022 suggested possible infarction but coronary  angiography 2/11/2022 showed nonobstructive CAD.     Cardiac Problems and Cardiac Diagnostics     Most Recent Cardiac testing:  ECG dated 2/11/2022 (personaly reviewed and interpreted): SR with 1st degree AV bloc  ms, nonspecific T wave abnormality, nonspecific IVCD with QRS duration 120 ms     Holter monitor 2/2/2022 (report reviewed):  The basic mechanism of the rhythm was sinus rhythm with a 1st degree AVB and an average heart rate of 61 beats per minute. Heart rate varied from 41 to 100 bpm.  Atrial ectopy was rare, consisting of less than 1% of all recorded beats. This consisted of only single PACs and 2 atrial pairs.  Ventricular ectopy was rare, consisting of less than 1% of all recorded beats. This consisted of single PVCs and 1 couplet.  Diary was returned with the following cardiovascular symptoms of skipped beat and pounding in chest. Skipped beat correlated with sinus rhythm with a single PVC and an average rate of 69 bpm. Pounding in chest correlated with sinus rhythm without ectopy and an average rate of 81 bpm.   In summary, Holter shows sinus rhythm with a 1st degree AVB and rare non-complex ectopy. Cardiovascular symptoms of skipped beat and pounding in chest were reported. Skipped beat correlated with sinus rhythm with a single PVC and an average rate of 69 bpm. Pounding in chest correlated with sinus rhythm without ectopy and an average rate of 81 bpm.     Holter monitor 8/4/2010 (report reviewed):  Impression: Normal sinus rhythm with moderately frequent PVCs representing 3% of his beats and these are unifocal and almost all are isolated.     ECHO 2/2/2022 (report reviewed):   1.  Normal left ventricular chamber size.  Calculated left ventricular ejection fraction (modified Rhodes technique) is 60 %.   2.  Mild right ventricular chamber enlargement.  Normal right ventricular systolic function.   3.  Mild mitral valve regurgitation.   4.  Aortic sinus of Valsalva is mildly dilated (4.4 cm,  ZScore = 2.4).   5.  When compared to the previous echocardiographic report of 9/16/2020, the aortic root is mildly dilated.      Stress test 2/1/2022 (report reviewed):   Abnormal pharmacologic stress perfusion imaging study.   Images demonstrated a very small sized, fixed perfusion abnormality in the distal inferolateral wall(s), consistent with infarction.   Increased left ventricular size with normal systolic function with a calculated LVEF of 54%.   Frequent PVCs ,occasional couplets, Rare triplets, Occasional APCs, .   Exercise protocol was terminated due to knee pain and submaximal heart rate.      Stress test 9/17/2010 (report reviewed):  Perfusion: Normal study. No evidence of ischemia or infarction.  Function: Lower limits of normal. The gated ejection fraction is 47%.     Coronary CT angiogram 10/14/2010 (report reviewed):  1. Total calcium score zero. No detectable calcium meeting criteria is identified.  2. Coronary CT angiogram: Right coronary artery dominant system.  3. Left main: Normal.  4. Left anterior descending: Diffuse narrowing of the LAD in its mid portion suggesting the possibility of myocardial  bridging. No detectable atherosclerotic plaque noted in the LAD or diagonal branches.  5. Ramus intermedius: Moderate to large sized vessel without detectable atherosclerotic plaque or stenosis noted.  6. Circumflex: Nondominant. No detectable atherosclerotic plaque or stenosis noted.  7. Right coronary artery: Large dominant vessel. Mild luminal irregularities without significant detectable  atherosclerotic plaque or stenosis noted.  8. Please review radiology portion of incidental noncardiac findings from [facility] below.     Cardiac cath 2/11/2022 (report reviewed):   DIAGNOSTIC - CORONARY   Mild coronary disease. No high grade coronary lesions   The left main artery is large in size.   The left main artery was normal in appearance and free of obstructive disease.   The LAD is large in  size.  Proximal and mid segments are large and normal.  The first diagonal branch is very large, long and normal.   The LAD has mild disease in the mid distal segment where there is about a 2.5 cm long segment of < 50% smooth narrowing.   40% stenosis in the distal LAD   The Circumflex is large in size.   The circumflex artery was normal in appearance and free of obstructive disease.   The RCA is large in size.   The RCA was normal in appearance and free of obstructive disease.     HEMODYNAMICS   The LVEDP is within normal limits      Medications  Allergies   Current Outpatient Medications   Medication Sig Dispense Refill     ALBUTEROL SULFATE  (90 BASE) MCG/ACT IN AERS 2 puffs AS DIRECTED       aspirin 81 MG EC tablet Take 81 mg by mouth daily       atorvastatin (LIPITOR) 10 MG tablet Take 0.5 tablets (5 mg) by mouth every morning (Patient taking differently: Take 10 mg by mouth every morning.) 45 tablet 3     EPINEPHrine (EPIPEN/ADRENACLICK/OR ANY BX GENERIC EQUIV) 0.3 MG/0.3ML injection 2-pack Inject 0.3 mg into the muscle       fluticasone (FLONASE) 50 MCG/ACT nasal spray Spray 1-2 sprays into both nostrils daily 1 Package 11     loratadine (CLARITIN) 10 MG tablet Take 1 tablet (10 mg) by mouth daily 30 tablet 1     losartan (COZAAR) 50 MG tablet Take 1 tablet (50 mg) by mouth daily 90 tablet 3     magnesium oxide 400 MG CAPS Take 250 mg by mouth every morning        sildenafil (REVATIO) 20 MG tablet Take 1-5 tablet as needed for erectile dysfunction        Allergies   Allergen Reactions     Soma [Carisoprodol] Anaphylaxis     Amoxicillin Rash        Physical Examination Review of Systems   /86 (BP Location: Right arm, Patient Position: Sitting, Cuff Size: Adult Large)   Pulse 76   Resp 16   Wt 109.3 kg (241 lb)   BMI 30.12 kg/m    Body mass index is 30.12 kg/m .  Wt Readings from Last 3 Encounters:   12/18/24 109.3 kg (241 lb)   03/26/24 108.9 kg (240 lb)   10/08/20 106.6 kg (235 lb)        General Appearance:   Pleasant  male, appears  stated age. no acute distress, obese body habitus   ENT/Mouth: membranes moist, no apparent gingival bleeding.      EYES:  no scleral icterus, normal conjunctivae   Neck: no carotid bruits. No anterior cervical lymphadenopaty   Respiratory:   lungs are clear to auscultation, no rales or wheezing, equal chest wall expansion    Cardiovascular:   Regular rhythm, normal rate. Normal first and second heart sounds with no murmurs, rubs, or gallops; the carotid, radial and posterior tibial pulses are intact, Jugular venous pressure normal, no edema bilaterally    Abdomen/GI:  no organomegaly, masses, bruits, or tenderness; bowel sounds are present   Extremities: no cyanosis or clubbing   Skin: no xanthelasma, warm.    Heme/lymph/ Immunology No apparent bleeding noted.   Neurologic: Alert and oriented. normal gait, no tremors     Psychiatric: Pleasant, calm, appropriate affect.    A complete 10 system review of systems was performed and is negative except as mentioned in the HPI/subjective.         Past History   Past Medical History:   Past Medical History:   Diagnosis Date     Allergic rhinitis      Asthma      Impotence of organic origin      Personal history of arthritis      PONV (postoperative nausea and vomiting)      Unspecified vitamin D deficiency        Past Surgical History:   Past Surgical History:   Procedure Laterality Date     COLONOSCOPY N/A 12/19/2019    Procedure: COLONOSCOPY, WITH POLYPECTOMY AND BIOPSY;  Surgeon: Joshua Sargent MD;  Location: [facility]     ENDOSCOPIC RELEASE ULNAR NERVE (ELBOW) Right 10/27/2015    Procedure: ENDOSCOPIC RELEASE ULNAR NERVE (ELBOW);  Surgeon: Elvin Dey MD;  Location: [facility]     ENDOSCOPIC TRANSPOSITION ULNAR NERVE (ELBOW) Left 12/29/2015    Procedure: ENDOSCOPIC TRANSPOSITION ULNAR NERVE (ELBOW);  Surgeon: Elvin Dey MD;  Location: [facility]     NASAL/SINUS POLYPECTOMY Bilateral 2009    [facility]      ORTHOPEDIC SURGERY       RELEASE CARPAL TUNNEL  10/27/2015    Procedure: RELEASE CARPAL TUNNEL;  Surgeon: Elvin Dey MD;  Location: [facility]     RELEASE CARPAL TUNNEL Left 2015    Procedure: RELEASE CARPAL TUNNEL;  Surgeon: Elvin Dey MD;  Location: [facility]       Family History:   Family History   Problem Relation Age of Onset     Cancer Mother      Coronary Artery Disease Father         MI     Coronary Artery Disease Sister      Coronary Artery Disease Brother      Valvular heart disease Brother         Social History:   Social History     Socioeconomic History     Marital status:      Spouse name: Not on file     Number of children: Not on file     Years of education: Not on file     Highest education level: Not on file   Occupational History     Not on file   Tobacco Use     Smoking status: Former     Current packs/day: 0.00     Average packs/day: 1 pack/day for 30.0 years (30.0 ttl pk-yrs)     Types: Cigarettes     Start date: 1974     Quit date: 2004     Years since quittin.6     Smokeless tobacco: Never   Substance and Sexual Activity     Alcohol use: Yes     Comment: occas     Drug use: No     Sexual activity: Not on file   Other Topics Concern     Parent/sibling w/ CABG, MI or angioplasty before 65F 55M? Not Asked   Social History Narrative     Not on file     Social Drivers of Health     Financial Resource Strain: Low Risk  (2023)    Received from [employer] [facility], [employer] [facility]    Financial Resource Strain      Difficulty of Paying Living Expenses: 3      Difficulty of Paying Living Expenses: Not on file   Food Insecurity: No Food Insecurity (2023)    Received from [employer] [facility], [employer] [facility]    Food Insecurity      Worried About Running Out of Food in the Last Year: 1   Transportation Needs: No Transportation Needs (2023)     Received from [employer] [facility], [facility] [employer] [facility]    Transportation Needs      Lack of Transportation (Medical): 1   Physical Activity: Not on file   Stress: Not on file   Social Connections: Unknown (2/20/2024)    Received from [employer] [facility], [facility]    Social Connections      Frequency of Communication with Friends and Family: Not on file   Interpersonal Safety: Not on file   Housing Stability: Low Risk  (2/16/2023)    Received from [facility], [facility]    Housing Stability      Unable to Pay for Housing in the Last Year: 1              Lab Results    Chemistry/lipid CBC Cardiac Enzymes/BNP/TSH/INR   Lab Results   Component Value Date    CHOL 112 02/07/2024    HDL 41 02/07/2024    LDL 49 02/07/2024    TRIG 109 02/07/2024    CR 1.01 10/23/2024    BUN 18.9 10/23/2024    POTASSIUM 3.8 10/23/2024     10/23/2024    CO2 24 10/23/2024      Lab Results   Component Value Date    WBC 10.7 12/09/2018    HGB 16.2 12/09/2018    HCT 47.0 12/09/2018    MCV 91 12/09/2018     12/09/2018             Dennis Kingsley MD [facility]  Non-Invasive Cardiologist  [facility]  Pager [phone]      Thank you for allowing me to participate in the care of your patient.      Sincerely,     Dennis Kingsley MD     [facility]  cc:   Dennis Kingsley MD  [address]  [place] [address]

## 2024-12-18 NOTE — PROGRESS NOTES
HEART CARE ENCOUNTER NOTE        Assessment/Recommendations   Assessment:    Premature ventricular contractions noted to be infrequent on his last ambulatory cardiac monitor 2/2/2022 but possibly still causing symptoms. He did not perceive a benefit from beta blocker therapy.  Preoperative cardiovascular examination prior to holmium laser enucleation of the prostate scheduled for 2/20/2025. This is a low risk procedure and he is well-compensated from a cardiovascular standpoint, putting him a low risk of perioperative major adverse cardiac events.  Nonischemic cardiomyopathy noted only once on a nuclear stress test 9/17/2010. It is possible that this was an inaccurate result as no other study has indicated an impaired left ventricular ejection fraction and is premature ventricular contraction burden has never been sufficiently high to cause cardiomyopathy.  Non-aneurysmal enlargement of the aortic root measuring 4.4 cm on transthoracic echocardiogram 2/2/2022.  Nonobstructive coronary artery disease seen on coronary angiography 2/11/2022.  Fatigue and muscle aches. These are nonspecific symptoms although statin therapy could potentially be causing this.  Essential hypertension. Controlled.  Hyperlipidemia.  Obstructive sleep apnea on CPAP.  Body mass index is 30.12 kg/m .    Plan:  Transthoracic echocardiogram to reassess his aortic root dimensions.  I offered him prolonged cardiac monitoring with a 14-day Zio patch to see if there is any other cardiac arrhythmia we have not been able to capture on his short duration Holter monitors, but he is not interested in this currently.  Although unlikely I did suggest he try stopping his atorvastatin 10 mg daily tablet for 1-2 weeks to see if any of his complaints improve.  From a cardiac standpoint he may proceed with is planned prostate procedure without need for further testing or intervention.  Continue losartan 50 mg daily.  He did not perceive a benefit from beta  blocker therapy in the past and as his arrhythmia burden seems low now, we can continue to stay off beta blockers.  Follow-up with me in 1 year.       The longitudinal plan of care for the diagnosis(es)/condition(s) as documented were addressed during this visit. Due to the added complexity in care, I will continue to support Akira in the subsequent management and with ongoing continuity of care.    History of Present Illness   Mr. Akira Fair is a 64 year old male with a significant past history of PVCs, nonobstructive CAD, and HTN presenting for follow-up.    He continues to feel fatigued and gets out of breath easily. He has a lot of joint and muscle aches. He also feels hear palpitations a few days each month, which are random and intermittent.    No chest pain/pressure/tightness, shortness of breath at rest, light headedness/dizziness, pre-syncope, syncope, lower extremity swelling, paroxysmal nocturnal dyspnea (PND), or orthopnea.    He is getting a minimally invasive prostate procedure on 2/20/2025.    He initially presented in 2010 with palpitations. A Holter monitor at that time showed 3% PVCs. Shortly after on 9/17/2010 he had a nuclear stress test which showed no ischemia or infarct but did note an EF of 47%.     It does not seem he had regular cardiology follow-up again until 2018. A TTE done then showed his LVEF to be normal. Repeat cardiac monitoring has shown only occasional PVCs although possible correlating with palpitations. However, he was on atelolol for sometime and did not have any improvement. He has other times where his heart feels like it is racing for a while but no sustained arrhythmias have been seen on cardiac monitoring. A repeat stress test in 2022 suggested possible infarction but coronary angiography 2/11/2022 showed nonobstructive CAD.     Cardiac Problems and Cardiac Diagnostics     Most Recent Cardiac testing:  ECG dated 2/11/2022 (personaly reviewed and interpreted): SR with 1st  degree AV bloc  ms, nonspecific T wave abnormality, nonspecific IVCD with QRS duration 120 ms     Holter monitor 2/2/2022 (report reviewed):  The basic mechanism of the rhythm was sinus rhythm with a 1st degree AVB and an average heart rate of 61 beats per minute. Heart rate varied from 41 to 100 bpm.  Atrial ectopy was rare, consisting of less than 1% of all recorded beats. This consisted of only single PACs and 2 atrial pairs.  Ventricular ectopy was rare, consisting of less than 1% of all recorded beats. This consisted of single PVCs and 1 couplet.  Diary was returned with the following cardiovascular symptoms of skipped beat and pounding in chest. Skipped beat correlated with sinus rhythm with a single PVC and an average rate of 69 bpm. Pounding in chest correlated with sinus rhythm without ectopy and an average rate of 81 bpm.   In summary, Holter shows sinus rhythm with a 1st degree AVB and rare non-complex ectopy. Cardiovascular symptoms of skipped beat and pounding in chest were reported. Skipped beat correlated with sinus rhythm with a single PVC and an average rate of 69 bpm. Pounding in chest correlated with sinus rhythm without ectopy and an average rate of 81 bpm.     Holter monitor 8/4/2010 (report reviewed):  Impression: Normal sinus rhythm with moderately frequent PVCs representing 3% of his beats and these are unifocal and almost all are isolated.     ECHO 2/2/2022 (report reviewed):   1.  Normal left ventricular chamber size.  Calculated left ventricular ejection fraction (modified Rhodes technique) is 60 %.   2.  Mild right ventricular chamber enlargement.  Normal right ventricular systolic function.   3.  Mild mitral valve regurgitation.   4.  Aortic sinus of Valsalva is mildly dilated (4.4 cm, ZScore = 2.4).   5.  When compared to the previous echocardiographic report of 9/16/2020, the aortic root is mildly dilated.      Stress test 2/1/2022 (report reviewed):   Abnormal pharmacologic  stress perfusion imaging study.   Images demonstrated a very small sized, fixed perfusion abnormality in the distal inferolateral wall(s), consistent with infarction.   Increased left ventricular size with normal systolic function with a calculated LVEF of 54%.   Frequent PVCs ,occasional couplets, Rare triplets, Occasional APCs, .   Exercise protocol was terminated due to knee pain and submaximal heart rate.      Stress test 9/17/2010 (report reviewed):  Perfusion: Normal study. No evidence of ischemia or infarction.  Function: Lower limits of normal. The gated ejection fraction is 47%.     Coronary CT angiogram 10/14/2010 (report reviewed):  1. Total calcium score zero. No detectable calcium meeting criteria is identified.  2. Coronary CT angiogram: Right coronary artery dominant system.  3. Left main: Normal.  4. Left anterior descending: Diffuse narrowing of the LAD in its mid portion suggesting the possibility of myocardial  bridging. No detectable atherosclerotic plaque noted in the LAD or diagonal branches.  5. Ramus intermedius: Moderate to large sized vessel without detectable atherosclerotic plaque or stenosis noted.  6. Circumflex: Nondominant. No detectable atherosclerotic plaque or stenosis noted.  7. Right coronary artery: Large dominant vessel. Mild luminal irregularities without significant detectable  atherosclerotic plaque or stenosis noted.  8. Please review radiology portion of incidental noncardiac findings from [facility] below.     Cardiac cath 2/11/2022 (report reviewed):   DIAGNOSTIC - CORONARY   Mild coronary disease. No high grade coronary lesions   The left main artery is large in size.   The left main artery was normal in appearance and free of obstructive disease.   The LAD is large in size.  Proximal and mid segments are large and normal.  The first diagonal branch is very large, long and normal.   The LAD has mild disease in the mid distal segment where there is about a 2.5 cm  long segment of < 50% smooth narrowing.   40% stenosis in the distal LAD   The Circumflex is large in size.   The circumflex artery was normal in appearance and free of obstructive disease.   The RCA is large in size.   The RCA was normal in appearance and free of obstructive disease.     HEMODYNAMICS   The LVEDP is within normal limits      Medications  Allergies   Current Outpatient Medications   Medication Sig Dispense Refill    ALBUTEROL SULFATE  (90 BASE) MCG/ACT IN AERS 2 puffs AS DIRECTED      aspirin 81 MG EC tablet Take 81 mg by mouth daily      atorvastatin (LIPITOR) 10 MG tablet Take 0.5 tablets (5 mg) by mouth every morning (Patient taking differently: Take 10 mg by mouth every morning.) 45 tablet 3    EPINEPHrine (EPIPEN/ADRENACLICK/OR ANY BX GENERIC EQUIV) 0.3 MG/0.3ML injection 2-pack Inject 0.3 mg into the muscle      fluticasone (FLONASE) 50 MCG/ACT nasal spray Spray 1-2 sprays into both nostrils daily 1 Package 11    loratadine (CLARITIN) 10 MG tablet Take 1 tablet (10 mg) by mouth daily 30 tablet 1    losartan (COZAAR) 50 MG tablet Take 1 tablet (50 mg) by mouth daily 90 tablet 3    magnesium oxide 400 MG CAPS Take 250 mg by mouth every morning       sildenafil (REVATIO) 20 MG tablet Take 1-5 tablet as needed for erectile dysfunction        Allergies   Allergen Reactions    Soma [Carisoprodol] Anaphylaxis    Amoxicillin Rash        Physical Examination Review of Systems   /86 (BP Location: Right arm, Patient Position: Sitting, Cuff Size: Adult Large)   Pulse 76   Resp 16   Wt 109.3 kg (241 lb)   BMI 30.12 kg/m    Body mass index is 30.12 kg/m .  Wt Readings from Last 3 Encounters:   12/18/24 109.3 kg (241 lb)   03/26/24 108.9 kg (240 lb)   10/08/20 106.6 kg (235 lb)       General Appearance:   Pleasant  male, appears  stated age. no acute distress, obese body habitus   ENT/Mouth: membranes moist, no apparent gingival bleeding.      EYES:  no scleral icterus, normal conjunctivae    Neck: no carotid bruits. No anterior cervical lymphadenopaty   Respiratory:   lungs are clear to auscultation, no rales or wheezing, equal chest wall expansion    Cardiovascular:   Regular rhythm, normal rate. Normal first and second heart sounds with no murmurs, rubs, or gallops; the carotid, radial and posterior tibial pulses are intact, Jugular venous pressure normal, no edema bilaterally    Abdomen/GI:  no organomegaly, masses, bruits, or tenderness; bowel sounds are present   Extremities: no cyanosis or clubbing   Skin: no xanthelasma, warm.    Heme/lymph/ Immunology No apparent bleeding noted.   Neurologic: Alert and oriented. normal gait, no tremors     Psychiatric: Pleasant, calm, appropriate affect.    A complete 10 system review of systems was performed and is negative except as mentioned in the HPI/subjective.         Past History   Past Medical History:   Past Medical History:   Diagnosis Date    Allergic rhinitis     Asthma     Impotence of organic origin     Personal history of arthritis     PONV (postoperative nausea and vomiting)     Unspecified vitamin D deficiency        Past Surgical History:   Past Surgical History:   Procedure Laterality Date    COLONOSCOPY N/A 12/19/2019    Procedure: COLONOSCOPY, WITH POLYPECTOMY AND BIOPSY;  Surgeon: Joshua Sargent MD;  Location: [facility]    ENDOSCOPIC RELEASE ULNAR NERVE (ELBOW) Right 10/27/2015    Procedure: ENDOSCOPIC RELEASE ULNAR NERVE (ELBOW);  Surgeon: Elvin Dey MD;  Location: [facility]    ENDOSCOPIC TRANSPOSITION ULNAR NERVE (ELBOW) Left 12/29/2015    Procedure: ENDOSCOPIC TRANSPOSITION ULNAR NERVE (ELBOW);  Surgeon: Elvin Dey MD;  Location: [facility]    NASAL/SINUS POLYPECTOMY Bilateral 2009    [facility]    ORTHOPEDIC SURGERY      RELEASE CARPAL TUNNEL  10/27/2015    Procedure: RELEASE CARPAL TUNNEL;  Surgeon: Elvin Dey MD;  Location: [facility]    RELEASE CARPAL TUNNEL Left 12/29/2015    Procedure: RELEASE CARPAL TUNNEL;  Surgeon:  Elvin Dey MD;  Location: [facility]       Family History:   Family History   Problem Relation Age of Onset    Cancer Mother     Coronary Artery Disease Father         MI    Coronary Artery Disease Sister     Coronary Artery Disease Brother     Valvular heart disease Brother         Social History:   Social History     Socioeconomic History    Marital status:      Spouse name: Not on file    Number of children: Not on file    Years of education: Not on file    Highest education level: Not on file   Occupational History    Not on file   Tobacco Use    Smoking status: Former     Current packs/day: 0.00     Average packs/day: 1 pack/day for 30.0 years (30.0 ttl pk-yrs)     Types: Cigarettes     Start date: 1974     Quit date: 2004     Years since quittin.6    Smokeless tobacco: Never   Substance and Sexual Activity    Alcohol use: Yes     Comment: occas    Drug use: No    Sexual activity: Not on file   Other Topics Concern    Parent/sibling w/ CABG, MI or angioplasty before 65F 55M? Not Asked   Social History Narrative    Not on file     Social Drivers of Health     Financial Resource Strain: Low Risk  (2023)    [facility], [facility]    Financial Resource Strain     Difficulty of Paying Living Expenses: 3     Difficulty of Paying Living Expenses: Not on file   Food Insecurity: No Food Insecurity (2023)    Received from [facility], [facility]    Food Insecurity     Worried About Running Out of Food in the Last Year: 1   Transportation Needs: No Transportation Needs (2023)    Received from [facility], [facility]    Transportation Needs     Lack of Transportation (Medical): 1   Physical Activity: Not on file   Stress: Not on file   Social Connections: Unknown  (2/20/2024)    [facility], [facility]    Social Connections     Frequency of Communication with Friends and Family: Not on file   Interpersonal Safety: Not on file   Housing Stability: Low Risk  (2/16/2023)    [id number], [facility]    Housing Stability     Unable to Pay for Housing in the Last Year: 1              Lab Results    Chemistry/lipid CBC Cardiac Enzymes/BNP/TSH/INR   Lab Results   Component Value Date    CHOL 112 02/07/2024    HDL 41 02/07/2024    LDL 49 02/07/2024    TRIG 109 02/07/2024    CR 1.01 10/23/2024    BUN 18.9 10/23/2024    POTASSIUM 3.8 10/23/2024     10/23/2024    CO2 24 10/23/2024      Lab Results   Component Value Date    WBC 10.7 12/09/2018    HGB 16.2 12/09/2018    HCT 47.0 12/09/2018    MCV 91 12/09/2018     12/09/2018             Dennis Kingsley MD [facility]  Non-Invasive Cardiologist  [facility]  Pager [phone]

## 2024-12-18 NOTE — PATIENT INSTRUCTIONS
We will recheck your heart with an echocardiogram.  You can try stopping your atorvastatin for 1-2 weeks to see if you feel a dramatic difference. If not, resume the medication.  See me back in 1 year.

## 2025-01-16 ENCOUNTER — HOSPITAL ENCOUNTER (OUTPATIENT)
Dept: CARDIOLOGY | Facility: HOSPITAL | Age: 65
Discharge: HOME | End: 2025-01-16
Attending: GENERAL ACUTE CARE HOSPITAL
Payer: COMMERCIAL

## 2025-01-16 DIAGNOSIS — I77.89 AORTIC ROOT ENLARGEMENT: ICD-10-CM

## 2025-01-16 LAB — LVEF ECHO: NORMAL

## 2025-01-16 PROCEDURE — 93306 TTE W/DOPPLER COMPLETE: CPT

## 2025-02-14 ENCOUNTER — LAB REQUISITION (OUTPATIENT)
Dept: LAB | Facility: CLINIC | Age: 65
End: 2025-02-14

## 2025-02-14 DIAGNOSIS — I10 ESSENTIAL (PRIMARY) HYPERTENSION: ICD-10-CM

## 2025-02-14 PROCEDURE — 82565 ASSAY OF CREATININE: CPT | Performed by: PHYSICIAN ASSISTANT

## 2025-02-15 LAB
ANION GAP SERPL CALCULATED.3IONS-SCNC: 13 MMOL/L (ref 7–15)
BUN SERPL-MCNC: 20.6 MG/DL (ref 8–23)
CALCIUM SERPL-MCNC: 9.6 MG/DL (ref 8.8–10.4)
CHLORIDE SERPL-SCNC: 103 MMOL/L (ref 98–107)
CREAT SERPL-MCNC: 1.08 MG/DL (ref 0.67–1.17)
EGFRCR SERPLBLD CKD-EPI 2021: 76 ML/MIN/1.73M2
GLUCOSE SERPL-MCNC: 109 MG/DL (ref 70–99)
HCO3 SERPL-SCNC: 23 MMOL/L (ref 22–29)
POTASSIUM SERPL-SCNC: 4 MMOL/L (ref 3.4–5.3)
SODIUM SERPL-SCNC: 139 MMOL/L (ref 135–145)

## 2025-03-22 ENCOUNTER — HEALTH MAINTENANCE LETTER (OUTPATIENT)
Age: 65
End: 2025-03-22

## 2025-04-16 ENCOUNTER — LAB REQUISITION (OUTPATIENT)
Dept: LAB | Facility: CLINIC | Age: 65
End: 2025-04-16

## 2025-04-16 DIAGNOSIS — E78.49 OTHER HYPERLIPIDEMIA: ICD-10-CM

## 2025-04-16 LAB
ALBUMIN SERPL BCG-MCNC: 4.1 G/DL (ref 3.5–5.2)
ALP SERPL-CCNC: 93 U/L (ref 40–150)
ALT SERPL W P-5'-P-CCNC: 56 U/L (ref 0–70)
ANION GAP SERPL CALCULATED.3IONS-SCNC: 12 MMOL/L (ref 7–15)
AST SERPL W P-5'-P-CCNC: 36 U/L (ref 0–45)
BILIRUB SERPL-MCNC: 0.4 MG/DL
BUN SERPL-MCNC: 19.9 MG/DL (ref 8–23)
CALCIUM SERPL-MCNC: 9.3 MG/DL (ref 8.8–10.4)
CHLORIDE SERPL-SCNC: 105 MMOL/L (ref 98–107)
CHOLEST SERPL-MCNC: 127 MG/DL
CREAT SERPL-MCNC: 1.13 MG/DL (ref 0.67–1.17)
EGFRCR SERPLBLD CKD-EPI 2021: 72 ML/MIN/1.73M2
FASTING STATUS PATIENT QL REPORTED: YES
FASTING STATUS PATIENT QL REPORTED: YES
GLUCOSE SERPL-MCNC: 108 MG/DL (ref 70–99)
HCO3 SERPL-SCNC: 23 MMOL/L (ref 22–29)
HDLC SERPL-MCNC: 40 MG/DL
LDLC SERPL CALC-MCNC: 64 MG/DL
NONHDLC SERPL-MCNC: 87 MG/DL
POTASSIUM SERPL-SCNC: 4.1 MMOL/L (ref 3.4–5.3)
PROT SERPL-MCNC: 6.8 G/DL (ref 6.4–8.3)
SODIUM SERPL-SCNC: 140 MMOL/L (ref 135–145)
TRIGL SERPL-MCNC: 117 MG/DL

## 2025-04-16 PROCEDURE — 83718 ASSAY OF LIPOPROTEIN: CPT | Performed by: PHYSICIAN ASSISTANT

## 2025-04-16 PROCEDURE — 84155 ASSAY OF PROTEIN SERUM: CPT | Performed by: PHYSICIAN ASSISTANT

## 2025-04-16 PROCEDURE — 82435 ASSAY OF BLOOD CHLORIDE: CPT | Performed by: PHYSICIAN ASSISTANT

## 2025-05-06 DIAGNOSIS — I10 ESSENTIAL HYPERTENSION: ICD-10-CM

## 2025-05-06 RX ORDER — LOSARTAN POTASSIUM 50 MG/1
50 TABLET ORAL DAILY
Qty: 90 TABLET | Refills: 1 | Status: SHIPPED | OUTPATIENT
Start: 2025-05-06

## 2025-07-24 DIAGNOSIS — E78.5 HYPERLIPIDEMIA, UNSPECIFIED HYPERLIPIDEMIA TYPE: ICD-10-CM

## 2025-07-24 RX ORDER — ATORVASTATIN CALCIUM 10 MG/1
10 TABLET, FILM COATED ORAL EVERY MORNING
Qty: 90 TABLET | Refills: 0 | Status: SHIPPED | OUTPATIENT
Start: 2025-07-24

## 2025-07-24 NOTE — TELEPHONE ENCOUNTER
Return phone call from pt. Verified has been taking Atorvastatin 10mg daily. Noted stated this way in 12/18/24 OV with [facility]. Filled as 10mg tablet for 90 days. Encouraged pt to call in September to arrange yearly follow-up with [facility] 12/2025. Understanding verbalized. No further needs at this time. LMS

## 2025-07-24 NOTE — TELEPHONE ENCOUNTER
Noted per 4/22/24 [facility] encounter, discrepancy noted in Atorvastatin dose. Cardiology med list indicates taking Atorvastatin 5mg daily. Per [facility] med list, indicates taking Atorvastatin 80 mg daily. Pt never confirmed/responded which dose he's taking.     Per 12/18/24 OV with [facility], indicates he may be taking as Atorvastatin as 10mg daily.     Left message for pt requesting callback to verify/clarify which dose he has been taking so can be properly filled. Direct line provided for callback #[phone]. SANAM